# Patient Record
Sex: FEMALE | Race: WHITE | Employment: OTHER | URBAN - METROPOLITAN AREA
[De-identification: names, ages, dates, MRNs, and addresses within clinical notes are randomized per-mention and may not be internally consistent; named-entity substitution may affect disease eponyms.]

---

## 2018-09-26 PROBLEM — E11.40 TYPE 2 DIABETES MELLITUS WITH DIABETIC NEUROPATHY (HCC): Status: ACTIVE | Noted: 2018-09-26

## 2018-10-29 ENCOUNTER — PATIENT OUTREACH (OUTPATIENT)
Dept: CASE MANAGEMENT | Age: 83
End: 2018-10-29

## 2018-10-29 NOTE — PROGRESS NOTES
Medication Adherence Outreach    Date/Time of Call:  10/29/2018  2:35 pm    Name of medication and dosage:   * Ramipril 10 mg 1 every day   * Pravastatin 40 mg 1 every day    Does the patient know the purpose and dosage of medication? * Yes    Are you getting a #30 day or #90 day supply of your medication? * 30 day supply    Are you still taking this medication? If not, why? * Yes    Transportation issues or any problems paying for the medication or other reason? * No    What pharmacy are you using to fill your medication and do you know the last time that you got your medication filled? * [facility]   * Last refill 10/23/2018   Are you having any side effects from taking your medication? * No       Any other questions or concerns expressed by the patient. * No    Comments:  * Discussed medication adherence with Ms. [facility] and she states she has no current barriers to prevent her from obtaining or taking her medication. Discussed potential cost savings by her obtaining a 90 day supply of her medication. She declines at this time .          Call Completed By:     Padmini Meza

## 2020-04-13 PROBLEM — K57.92 DIVERTICULITIS: Status: ACTIVE | Noted: 2020-04-13

## 2020-05-08 ENCOUNTER — APPOINTMENT (OUTPATIENT)
Dept: GENERAL RADIOLOGY | Age: 85
DRG: 536 | End: 2020-05-08
Attending: EMERGENCY MEDICINE
Payer: MEDICARE

## 2020-05-08 ENCOUNTER — ANESTHESIA EVENT (OUTPATIENT)
Dept: SURGERY | Age: 85
End: 2020-05-08

## 2020-05-08 ENCOUNTER — HOSPITAL ENCOUNTER (INPATIENT)
Age: 85
LOS: 2 days | Discharge: HOME HEALTH | DRG: 536 | End: 2020-05-10
Attending: EMERGENCY MEDICINE | Admitting: INTERNAL MEDICINE
Payer: MEDICARE

## 2020-05-08 DIAGNOSIS — S72.002A CLOSED LEFT HIP FRACTURE, INITIAL ENCOUNTER (HCC): Primary | ICD-10-CM
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
## 2020-05-08 DIAGNOSIS — S72.145A CLOSED NONDISPLACED INTERTROCHANTERIC FRACTURE OF LEFT FEMUR, INITIAL ENCOUNTER (HCC): ICD-10-CM

## 2020-05-08 PROBLEM — E11.9 NON-INSULIN DEPENDENT TYPE 2 DIABETES MELLITUS (HCC): Status: ACTIVE | Noted: 2017-02-21

## 2020-05-08 PROBLEM — Z86.73 HISTORY OF STROKE: Chronic | Status: ACTIVE | Noted: 2020-05-08

## 2020-05-08 PROBLEM — S72.142A INTERTROCHANTERIC FRACTURE OF LEFT HIP (HCC): Status: ACTIVE | Noted: 2020-05-08

## 2020-05-08 PROBLEM — S72.009A HIP FRACTURE (HCC): Status: ACTIVE | Noted: 2020-05-08

## 2020-05-08 PROBLEM — M85.80 OSTEOPENIA: Chronic | Status: ACTIVE | Noted: 2020-05-08

## 2020-05-08 PROBLEM — E11.9 NON-INSULIN DEPENDENT TYPE 2 DIABETES MELLITUS (HCC): Chronic | Status: ACTIVE | Noted: 2017-02-21

## 2020-05-08 LAB
ABO + RH BLD: NORMAL
ANION GAP SERPL CALC-SCNC: 8 MMOL/L (ref 7–16)
APPEARANCE UR: CLEAR
APTT PPP: 26 SEC (ref 24.3–35.4)
ATRIAL RATE: 97 BPM
BILIRUB UR QL: NEGATIVE
BLOOD GROUP ANTIBODIES SERPL: NORMAL
BUN SERPL-MCNC: 18 MG/DL (ref 8–23)
CALCIUM SERPL-MCNC: 9.5 MG/DL (ref 8.3–10.4)
CALCULATED P AXIS, ECG09: 59 DEGREES
CALCULATED R AXIS, ECG10: 16 DEGREES
CALCULATED T AXIS, ECG11: 35 DEGREES
CHLORIDE SERPL-SCNC: 105 MMOL/L (ref 98–107)
CK SERPL-CCNC: 184 U/L (ref 21–215)
CO2 SERPL-SCNC: 25 MMOL/L (ref 21–32)
COLOR UR: YELLOW
CREAT SERPL-MCNC: 0.87 MG/DL (ref 0.6–1)
DIAGNOSIS, 93000: NORMAL
ERYTHROCYTE [DISTWIDTH] IN BLOOD BY AUTOMATED COUNT: 14.2 % (ref 11.9–14.6)
GLUCOSE BLD STRIP.AUTO-MCNC: 152 MG/DL (ref 65–100)
GLUCOSE SERPL-MCNC: 149 MG/DL (ref 65–100)
GLUCOSE UR STRIP.AUTO-MCNC: >1000 MG/DL
HCT VFR BLD AUTO: 44.7 % (ref 35.8–46.3)
HGB BLD-MCNC: 14.7 G/DL (ref 11.7–15.4)
HGB UR QL STRIP: NEGATIVE
INR PPP: 0.9
KETONES UR QL STRIP.AUTO: NEGATIVE MG/DL
LEUKOCYTE ESTERASE UR QL STRIP.AUTO: NEGATIVE
MCH RBC QN AUTO: 30.2 PG (ref 26.1–32.9)
MCHC RBC AUTO-ENTMCNC: 32.9 G/DL (ref 31.4–35)
MCV RBC AUTO: 91.8 FL (ref 79.6–97.8)
NITRITE UR QL STRIP.AUTO: NEGATIVE
NRBC # BLD: 0 K/UL (ref 0–0.2)
P-R INTERVAL, ECG05: 162 MS
PH UR STRIP: 5.5 [PH] (ref 5–9)
PLATELET # BLD AUTO: 262 K/UL (ref 150–450)
PMV BLD AUTO: 10.8 FL (ref 9.4–12.3)
POTASSIUM SERPL-SCNC: 4.3 MMOL/L (ref 3.5–5.1)
POTASSIUM SERPL-SCNC: 5.8 MMOL/L (ref 3.5–5.1)
PROT UR STRIP-MCNC: NEGATIVE MG/DL
PROTHROMBIN TIME: 12.3 SEC (ref 12–14.7)
Q-T INTERVAL, ECG07: 332 MS
QRS DURATION, ECG06: 88 MS
QTC CALCULATION (BEZET), ECG08: 421 MS
RBC # BLD AUTO: 4.87 M/UL (ref 4.05–5.2)
SODIUM SERPL-SCNC: 138 MMOL/L (ref 136–145)
SP GR UR REFRACTOMETRY: 1.02 (ref 1–1.02)
SPECIMEN EXP DATE BLD: NORMAL
UROBILINOGEN UR QL STRIP.AUTO: 0.2 EU/DL (ref 0.2–1)
VENTRICULAR RATE, ECG03: 97 BPM
WBC # BLD AUTO: 13.5 K/UL (ref 4.3–11.1)

## 2020-05-08 PROCEDURE — 84132 ASSAY OF SERUM POTASSIUM: CPT

## 2020-05-08 PROCEDURE — 80048 BASIC METABOLIC PNL TOTAL CA: CPT

## 2020-05-08 PROCEDURE — 74011250636 HC RX REV CODE- 250/636: Performed by: NURSE PRACTITIONER

## 2020-05-08 PROCEDURE — 71045 X-RAY EXAM CHEST 1 VIEW: CPT

## 2020-05-08 PROCEDURE — 74011000302 HC RX REV CODE- 302: Performed by: NURSE PRACTITIONER

## 2020-05-08 PROCEDURE — 73552 X-RAY EXAM OF FEMUR 2/>: CPT

## 2020-05-08 PROCEDURE — 99285 EMERGENCY DEPT VISIT HI MDM: CPT

## 2020-05-08 PROCEDURE — 74011250636 HC RX REV CODE- 250/636: Performed by: EMERGENCY MEDICINE

## 2020-05-08 PROCEDURE — 74011250636 HC RX REV CODE- 250/636: Performed by: INTERNAL MEDICINE

## 2020-05-08 PROCEDURE — 81003 URINALYSIS AUTO W/O SCOPE: CPT

## 2020-05-08 PROCEDURE — 65270000029 HC RM PRIVATE

## 2020-05-08 PROCEDURE — 82962 GLUCOSE BLOOD TEST: CPT

## 2020-05-08 PROCEDURE — 96375 TX/PRO/DX INJ NEW DRUG ADDON: CPT

## 2020-05-08 PROCEDURE — 85027 COMPLETE CBC AUTOMATED: CPT

## 2020-05-08 PROCEDURE — 73502 X-RAY EXAM HIP UNI 2-3 VIEWS: CPT

## 2020-05-08 PROCEDURE — 85610 PROTHROMBIN TIME: CPT

## 2020-05-08 PROCEDURE — 93005 ELECTROCARDIOGRAM TRACING: CPT | Performed by: EMERGENCY MEDICINE

## 2020-05-08 PROCEDURE — 82550 ASSAY OF CK (CPK): CPT

## 2020-05-08 PROCEDURE — 86900 BLOOD TYPING SEROLOGIC ABO: CPT

## 2020-05-08 PROCEDURE — 36415 COLL VENOUS BLD VENIPUNCTURE: CPT

## 2020-05-08 PROCEDURE — 96376 TX/PRO/DX INJ SAME DRUG ADON: CPT

## 2020-05-08 PROCEDURE — 74011250637 HC RX REV CODE- 250/637: Performed by: NURSE PRACTITIONER

## 2020-05-08 PROCEDURE — 85730 THROMBOPLASTIN TIME PARTIAL: CPT

## 2020-05-08 PROCEDURE — 96374 THER/PROPH/DIAG INJ IV PUSH: CPT

## 2020-05-08 PROCEDURE — 93005 ELECTROCARDIOGRAM TRACING: CPT | Performed by: NURSE PRACTITIONER

## 2020-05-08 PROCEDURE — 86580 TB INTRADERMAL TEST: CPT | Performed by: NURSE PRACTITIONER

## 2020-05-08 PROCEDURE — 74011636637 HC RX REV CODE- 636/637: Performed by: NURSE PRACTITIONER

## 2020-05-08 RX ORDER — SODIUM CHLORIDE 0.9 % (FLUSH) 0.9 %
5-40 SYRINGE (ML) INJECTION AS NEEDED
Status: DISCONTINUED | OUTPATIENT
Start: 2020-05-08 | End: 2020-05-10 | Stop reason: HOSPADM

## 2020-05-08 RX ORDER — OXYCODONE HYDROCHLORIDE 5 MG/1
10 TABLET ORAL
Status: DISCONTINUED | OUTPATIENT
Start: 2020-05-08 | End: 2020-05-10 | Stop reason: HOSPADM

## 2020-05-08 RX ORDER — GABAPENTIN 300 MG/1
600 CAPSULE ORAL 3 TIMES DAILY
Status: DISCONTINUED | OUTPATIENT
Start: 2020-05-10 | End: 2020-05-10 | Stop reason: HOSPADM

## 2020-05-08 RX ORDER — ONDANSETRON 2 MG/ML
4 INJECTION INTRAMUSCULAR; INTRAVENOUS
Status: COMPLETED | OUTPATIENT
Start: 2020-05-08 | End: 2020-05-08

## 2020-05-08 RX ORDER — HYDROMORPHONE HYDROCHLORIDE 1 MG/ML
0.5 INJECTION, SOLUTION INTRAMUSCULAR; INTRAVENOUS; SUBCUTANEOUS
Status: COMPLETED | OUTPATIENT
Start: 2020-05-08 | End: 2020-05-08

## 2020-05-08 RX ORDER — SODIUM CHLORIDE 0.9 % (FLUSH) 0.9 %
5-40 SYRINGE (ML) INJECTION EVERY 8 HOURS
Status: DISCONTINUED | OUTPATIENT
Start: 2020-05-08 | End: 2020-05-10 | Stop reason: HOSPADM

## 2020-05-08 RX ORDER — HYDROMORPHONE HYDROCHLORIDE 1 MG/ML
0.5 INJECTION, SOLUTION INTRAMUSCULAR; INTRAVENOUS; SUBCUTANEOUS
Status: DISCONTINUED | OUTPATIENT
Start: 2020-05-08 | End: 2020-05-10 | Stop reason: HOSPADM

## 2020-05-08 RX ORDER — DULOXETIN HYDROCHLORIDE 30 MG/1
30 CAPSULE, DELAYED RELEASE ORAL DAILY
Status: DISCONTINUED | OUTPATIENT
Start: 2020-05-09 | End: 2020-05-10 | Stop reason: HOSPADM

## 2020-05-08 RX ORDER — INSULIN LISPRO 100 [IU]/ML
INJECTION, SOLUTION INTRAVENOUS; SUBCUTANEOUS
Status: DISCONTINUED | OUTPATIENT
Start: 2020-05-08 | End: 2020-05-10 | Stop reason: HOSPADM

## 2020-05-08 RX ORDER — LISINOPRIL 20 MG/1
40 TABLET ORAL DAILY
Status: DISCONTINUED | OUTPATIENT
Start: 2020-05-09 | End: 2020-05-10 | Stop reason: HOSPADM

## 2020-05-08 RX ORDER — ACETAMINOPHEN 325 MG/1
650 TABLET ORAL EVERY 8 HOURS
Status: DISCONTINUED | OUTPATIENT
Start: 2020-05-08 | End: 2020-05-10 | Stop reason: HOSPADM

## 2020-05-08 RX ORDER — SODIUM CHLORIDE 9 MG/ML
75 INJECTION, SOLUTION INTRAVENOUS CONTINUOUS
Status: DISCONTINUED | OUTPATIENT
Start: 2020-05-08 | End: 2020-05-08

## 2020-05-08 RX ORDER — PRAVASTATIN SODIUM 20 MG/1
40 TABLET ORAL
Status: DISCONTINUED | OUTPATIENT
Start: 2020-05-09 | End: 2020-05-10 | Stop reason: HOSPADM

## 2020-05-08 RX ORDER — NALOXONE HYDROCHLORIDE 0.4 MG/ML
0.4 INJECTION, SOLUTION INTRAMUSCULAR; INTRAVENOUS; SUBCUTANEOUS
Status: DISCONTINUED | OUTPATIENT
Start: 2020-05-08 | End: 2020-05-10 | Stop reason: HOSPADM

## 2020-05-08 RX ORDER — ONDANSETRON 2 MG/ML
4 INJECTION INTRAMUSCULAR; INTRAVENOUS
Status: DISCONTINUED | OUTPATIENT
Start: 2020-05-08 | End: 2020-05-10 | Stop reason: HOSPADM

## 2020-05-08 RX ORDER — SODIUM CHLORIDE 9 MG/ML
100 INJECTION, SOLUTION INTRAVENOUS CONTINUOUS
Status: DISCONTINUED | OUTPATIENT
Start: 2020-05-08 | End: 2020-05-10 | Stop reason: HOSPADM

## 2020-05-08 RX ORDER — HYDROMORPHONE HYDROCHLORIDE 1 MG/ML
0.5 INJECTION, SOLUTION INTRAMUSCULAR; INTRAVENOUS; SUBCUTANEOUS
Status: DISCONTINUED | OUTPATIENT
Start: 2020-05-08 | End: 2020-05-08

## 2020-05-08 RX ORDER — GABAPENTIN 300 MG/1
600 CAPSULE ORAL ONCE
Status: COMPLETED | OUTPATIENT
Start: 2020-05-08 | End: 2020-05-08

## 2020-05-08 RX ADMIN — INSULIN LISPRO 2 UNITS: 100 INJECTION, SOLUTION INTRAVENOUS; SUBCUTANEOUS at 21:44

## 2020-05-08 RX ADMIN — SODIUM CHLORIDE 75 ML/HR: 900 INJECTION, SOLUTION INTRAVENOUS at 17:19

## 2020-05-08 RX ADMIN — ONDANSETRON 4 MG: 2 INJECTION INTRAMUSCULAR; INTRAVENOUS at 14:16

## 2020-05-08 RX ADMIN — TUBERCULIN PURIFIED PROTEIN DERIVATIVE 5 UNITS: 5 INJECTION, SOLUTION INTRADERMAL at 21:45

## 2020-05-08 RX ADMIN — ACETAMINOPHEN 650 MG: 325 TABLET, FILM COATED ORAL at 18:03

## 2020-05-08 RX ADMIN — GABAPENTIN 600 MG: 300 CAPSULE ORAL at 21:43

## 2020-05-08 RX ADMIN — ACETAMINOPHEN 650 MG: 325 TABLET, FILM COATED ORAL at 21:43

## 2020-05-08 RX ADMIN — HYDROMORPHONE HYDROCHLORIDE 0.5 MG: 1 INJECTION, SOLUTION INTRAMUSCULAR; INTRAVENOUS; SUBCUTANEOUS at 15:06

## 2020-05-08 RX ADMIN — HYDROMORPHONE HYDROCHLORIDE 0.5 MG: 1 INJECTION, SOLUTION INTRAMUSCULAR; INTRAVENOUS; SUBCUTANEOUS at 14:16

## 2020-05-08 RX ADMIN — SODIUM CHLORIDE 100 ML/HR: 9 INJECTION, SOLUTION INTRAVENOUS at 18:07

## 2020-05-08 RX ADMIN — SODIUM CHLORIDE 1000 ML: 9 INJECTION, SOLUTION INTRAVENOUS at 14:16

## 2020-05-08 RX ADMIN — OXYCODONE HYDROCHLORIDE 10 MG: 5 TABLET ORAL at 21:43

## 2020-05-08 RX ADMIN — Medication 10 ML: at 21:45

## 2020-05-08 RX ADMIN — HYDROMORPHONE HYDROCHLORIDE 0.5 MG: 1 INJECTION, SOLUTION INTRAMUSCULAR; INTRAVENOUS; SUBCUTANEOUS at 17:04

## 2020-05-08 RX ADMIN — OXYCODONE HYDROCHLORIDE 10 MG: 5 TABLET ORAL at 18:04

## 2020-05-08 RX ADMIN — ONDANSETRON 4 MG: 2 INJECTION INTRAMUSCULAR; INTRAVENOUS at 17:04

## 2020-05-08 NOTE — ED TRIAGE NOTES
Pt BIB EMS after fall in the yard, states that her leg gave out and she fell onto the hip. Found in the yard on EMS arrival, unable to stand. Reports sharp pain with movement of her left hip. Pt masked in triage.

## 2020-05-08 NOTE — ANESTHESIA PREPROCEDURE EVALUATION
Relevant Problems   No relevant active problems       Anesthetic History   No history of anesthetic complications            Review of Systems / Medical History  Patient summary reviewed and pertinent labs reviewed    Pulmonary  Within defined limits                 Neuro/Psych       CVA (residual left sided weakness)       Cardiovascular    Hypertension              Exercise tolerance: >4 METS  Comments: Normal stress '19  ECHO '19 - preserved EF with mod TR   GI/Hepatic/Renal             Pertinent negatives: No GERD   Endo/Other    Diabetes (A1c 7.9 per patient): well controlled, type 2         Other Findings            Physical Exam    Airway  Mallampati: I  TM Distance: 4 - 6 cm  Neck ROM: normal range of motion   Mouth opening: Normal     Cardiovascular    Rhythm: regular  Rate: normal         Dental    Dentition: Full lower dentures and Full upper dentures     Pulmonary  Breath sounds clear to auscultation               Abdominal         Other Findings            Anesthetic Plan    ASA: 3  Anesthesia type: general          Induction: Intravenous  Anesthetic plan and risks discussed with: Patient      Patient prefers GA over spinal.  Recheck K+ tomorrow morning.
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
## 2020-05-08 NOTE — PROGRESS NOTES
05/08/20 1746   Dual Skin Pressure Injury Assessment   Dual Skin Pressure Injury Assessment WDL   Second Care Provider (Based on [address] [place] [address]) Rj Hayward RN   Skin Integumentary   Skin Integumentary (WDL) X   Skin Color Appropriate for ethnicity   Skin Condition/Temp Warm;Dry   Skin Integrity Scars (comment)   Turgor Epidermis thin w/ loss of subcut tissue

## 2020-05-08 NOTE — ED PROVIDER NOTES
[facility] Emergency Department  Arrival Date/Time: 5/8/2020 @  2:08 PM      Aaron Hutchinson  MRN: [id number]      82 y.o. female    YOB: 1935   [phone] (home)     [facility] EMERGENCY DEPT ER05/05  Seen on 5/8/2020 @ 2:11 PM      Today's Chief Complaint:   Chief Complaint   Patient presents with    Hip Pain     HPI: 28-year-old female presents to the emergency department with left hip pain  She was out in her yard she states her left leg gave way and she fell over. She tried to crawl back to the house but was unable to get up. Her daughter called EMS and she was transported for evaluation. Left hip is held flexed and internally rotated. She is tender over the posterior aspect of the hip. LOC no confusion no nausea no vomiting. HPI    Review of Systems: Review of Systems   Constitutional: Negative for activity change, appetite change, chills and fever. HENT: Negative. Respiratory: Negative for shortness of breath. Gastrointestinal: Negative for nausea and vomiting. Genitourinary: Negative. Musculoskeletal: Positive for arthralgias. Neurological: Negative. Psychiatric/Behavioral: Negative. Past Medical History: Primary Care Doctor: Vinnie Hackett MD  Meds, PMH, PSHx, SocHx at end of this note     Allergies: Allergies   Allergen Reactions    Neosporin [Hydrocortisone] Other (comments)     Made rash worse when pt had the Shingles    Codeine Other (comments)     Headache      Sulfa (Sulfonamide Antibiotics) Hives         Key Anti-Platelet Anticoagulant Meds             aspirin delayed-release 81 mg tablet Take  by mouth daily. Physical Exam:  Nursing documentation reviewed. Patient Vitals for the past 24 hrs:   Temp Pulse Resp BP SpO2   05/08/20 1507  (!) 101 19 171/79 95 %   05/08/20 1426  (!) 103 9  92 %   05/08/20 1405 98.7 °F (37.1 °C) (!) 112 20 158/81 94 %    Vital signs were reviewed.   Physical Exam  Vitals signs and nursing note reviewed. Constitutional:       General: She is not in acute distress. Appearance: Normal appearance. She is not ill-appearing or toxic-appearing. HENT:      Head: Normocephalic and atraumatic. Right Ear: Tympanic membrane normal.      Left Ear: Tympanic membrane normal.      Nose: Nose normal.      Mouth/Throat:      Mouth: Mucous membranes are moist.   Eyes:      Pupils: Pupils are equal, round, and reactive to light. Cardiovascular:      Rate and Rhythm: Normal rate and regular rhythm. Pulses: Normal pulses. Heart sounds: Normal heart sounds. Pulmonary:      Effort: Pulmonary effort is normal.      Breath sounds: Normal breath sounds. Musculoskeletal:         General: Tenderness and deformity present. Skin:     General: Skin is warm. Capillary Refill: Capillary refill takes less than 2 seconds. Neurological:      Mental Status: She is alert and oriented to person, place, and time. Psychiatric:         Mood and Affect: Mood normal.         Behavior: Behavior normal.         MEDICAL DECISION MAKING:   Differential Diagnosis:    MDM  Number of Diagnoses or Management Options  Closed left hip fracture, initial encounter [facility]):   Fall, initial encounter:   Diagnosis management comments: 28-year-old female status post fall at home complains of pain in the left hip. It is noted to be internally rotated and flexed    Differential at this time includes traumatic injury, muscle contusion, hip dislocation versus a hip fracture    We will obtain labs and x-ray treat her pain and reassess.              Amount and/or Complexity of Data Reviewed  Clinical lab tests: ordered  Tests in the radiology section of CPT®: ordered  Tests in the medicine section of CPT®: ordered    Risk of Complications, Morbidity, and/or Mortality  Presenting problems: moderate  Diagnostic procedures: minimal  Management options: high      Extended Emergency Contact Information  Primary Emergency Contact: Martha Mello  Address: [place] [address] [place] [address] ab[address] Phone: [phone]  Relation: Child   Family updated at 2:22 PM by Donna Estrada MD.          Data/Management:    Lab findings during this visit:   Recent Results (from the past 50 hour(s))   TYPE & SCREEN    Collection Time: 05/08/20  2:21 PM   Result Value Ref Range    Crossmatch Expiration 05/11/2020     ABO/Rh(D) A POSITIVE     Antibody screen NEG    CBC W/O DIFF    Collection Time: 05/08/20  2:22 PM   Result Value Ref Range    WBC 13.5 (H) 4.3 - 11.1 K/uL    RBC 4.87 4.05 - 5.2 M/uL    HGB 14.7 11.7 - 15.4 g/dL    HCT 44.7 35.8 - 46.3 %    MCV 91.8 79.6 - 97.8 FL    MCH 30.2 26.1 - 32.9 PG    MCHC 32.9 31.4 - 35.0 g/dL    RDW 14.2 11.9 - 14.6 %    PLATELET 707 818 - 140 K/uL    MPV 10.8 9.4 - 12.3 FL    ABSOLUTE NRBC 0.00 0.0 - 0.2 K/uL   METABOLIC PANEL, BASIC    Collection Time: 05/08/20  2:22 PM   Result Value Ref Range    Sodium 138 136 - 145 mmol/L    Potassium 5.8 (H) 3.5 - 5.1 mmol/L    Chloride 105 98 - 107 mmol/L    CO2 25 21 - 32 mmol/L    Anion gap 8 7 - 16 mmol/L    Glucose 149 (H) 65 - 100 mg/dL    BUN 18 8 - 23 MG/DL    Creatinine 0.87 0.6 - 1.0 MG/DL    GFR est AA >60 >60 ml/min/1.73m2    GFR est non-AA >60 >60 ml/min/1.73m2    Calcium 9.5 8.3 - 10.4 MG/DL   PROTHROMBIN TIME + INR    Collection Time: 05/08/20  2:22 PM   Result Value Ref Range    Prothrombin time 12.3 12.0 - 14.7 sec    INR 0.9     CK    Collection Time: 05/08/20  2:22 PM   Result Value Ref Range     21 - 215 U/L     Radiology studies during this visit: No results found.   Medications given in the ED:   Medications   HYDROmorphone (PF) (DILAUDID) injection 0.5 mg (0.5 mg IntraVENous Given 5/8/20 1506)   HYDROmorphone (PF) (DILAUDID) injection 0.5 mg (0.5 mg IntraVENous Given 5/8/20 1416)   ondansetron (ZOFRAN) injection 4 mg (4 mg IntraVENous Given 5/8/20 1416)   sodium chloride 0.9 % bolus infusion 1,000 mL (1,000 mL IntraVENous New Bag 5/8/20 1416)       Recheck and Additional Documentation:  (use .addrecheck  . addsepsis   . addstroke   . addhip  . addhandoff  . addcctime)     Xray reviewed -- pt with intertroch fracture on the left    Called and advised her family. Procedure Documentation:   Procedures     Other ED Course Notes:     ED Course as of May 08 1606   Fri May 08, 2020   1602 XR HIP LT W OR WO PELV 2-3 VWS [GH]   1605 Pt with left inter-troch fracture. Consult ortho  Consult hospitalist   XR FEMUR LT 2 V [GH]      ED Course User Index  [GH] Serjio Queen MD       I wore appropriate PPE throughout this patient's ED encounter. Assessment and Plan:    Impression:     ICD-10-CM ICD-9-CM    1. Closed left hip fracture, initial encounter ([facility] [address] S72.002A 820.8    2. Fall, initial encounter [address] Jose Elias Galindoer E888.9       Disposition:    Follow-up Information    None       Current Discharge Medication List          Past Medical History:      Past Medical History:   Diagnosis Date    Colon polyps     Diabetes ([facility] [address]     Hiatal hernia     Hypercholesterolemia     Hypertension     Stroke ([facility] [address]     left sided weakness  12/09    Thyroid disease     goiter     Past Surgical History:   Procedure Laterality Date    HX GI      esophageal stricture    HX PARTIAL THYROIDECTOMY      HX VIC AND BSO       Social History     Tobacco Use    Smoking status: Never Smoker    Smokeless tobacco: Never Used   Substance Use Topics    Alcohol use: No    Drug use: No     Prior to Admission Medications   Prescriptions Last Dose Informant Patient Reported? Taking? Blood-Glucose Meter monitoring kit   No No   Sig: Accuchek meter, strips lancets    Check blood sugar once a day.  250.00   Patient taking differently: Accuchek meter, strips lancets     Check blood sugar once a day.  250.00   DULoxetine (CYMBALTA) 30 mg capsule   No No   Sig: Take 1 Cap by mouth daily.    OTHER   No No   Sig: Rollator with seat   aspirin delayed-release 81 mg tablet   Yes No   Sig: Take  by mouth daily. diclofenac (VOLTAREN) 1 % gel   No No   Sig: Apply 4 g to affected area four (4) times daily. empagliflozin (Jardiance) 10 mg tablet   No No   Sig: Take 1 Tab by mouth daily. gabapentin (NEURONTIN) 300 mg capsule   No No   Sig: Take 2 capsules three times a day   glucose blood VI test strips (ACCU-CHEK SIDDHARTH PLUS TEST STRP) strip   No No   Sig: Check blood sugar once a day   E11.65   lancets (ACCU-CHEK SOFTCLIX LANCETS) misc   No No   Sig: Check blood sugar once a day  E11.65   metFORMIN (GLUCOPHAGE) 500 mg tablet   No No   Sig: Take two tablets twice a day   pioglitazone (ACTOS) 15 mg tablet   No No   Sig: Take one a day   pravastatin (PRAVACHOL) 40 mg tablet   No No   Sig: Take 1 Tab by mouth nightly. ramipril (ALTACE) 10 mg capsule   No No   Sig: Take 1 Cap by mouth daily.       Facility-Administered Medications: None

## 2020-05-08 NOTE — ED NOTES
TRANSFER - OUT REPORT:    Verbal report given to [facility]) on [place] Rafael  being transferred to  701(unit) for routine progression of care       Report consisted of patients Situation, Background, Assessment and   Recommendations(SBAR). Information from the following report(s) ED Summary was reviewed with the receiving nurse. Lines:   Peripheral IV 05/08/20 Right Antecubital (Active)        Opportunity for questions and clarification was provided.       Patient transported with:   O2 @ 2 liters

## 2020-05-08 NOTE — PROGRESS NOTES
TRANSFER - IN REPORT:    Verbal report received from Diane Pollack RN on Joel Davis  being received from ED for routine progression of care      Report consisted of patients Situation, Background, Assessment and   Recommendations(SBAR). Information from the following report(s) SBAR and ED Summary was reviewed with the receiving nurse. Opportunity for questions and clarification was provided. Assessment completed upon patients arrival to unit and care assumed.

## 2020-05-09 ENCOUNTER — APPOINTMENT (OUTPATIENT)
Dept: CT IMAGING | Age: 85
DRG: 536 | End: 2020-05-09
Attending: ORTHOPAEDIC SURGERY
Payer: MEDICARE

## 2020-05-09 ENCOUNTER — ANESTHESIA (OUTPATIENT)
Dept: SURGERY | Age: 85
End: 2020-05-09

## 2020-05-09 LAB
ANION GAP SERPL CALC-SCNC: 5 MMOL/L (ref 7–16)
ATRIAL RATE: 103 BPM
BASOPHILS # BLD: 0.1 K/UL (ref 0–0.2)
BASOPHILS NFR BLD: 1 % (ref 0–2)
BUN SERPL-MCNC: 11 MG/DL (ref 8–23)
CALCIUM SERPL-MCNC: 8.6 MG/DL (ref 8.3–10.4)
CALCULATED P AXIS, ECG09: 68 DEGREES
CALCULATED R AXIS, ECG10: -2 DEGREES
CALCULATED T AXIS, ECG11: 44 DEGREES
CHLORIDE SERPL-SCNC: 108 MMOL/L (ref 98–107)
CO2 SERPL-SCNC: 29 MMOL/L (ref 21–32)
CREAT SERPL-MCNC: 0.68 MG/DL (ref 0.6–1)
DIAGNOSIS, 93000: NORMAL
DIFFERENTIAL METHOD BLD: NORMAL
EOSINOPHIL # BLD: 0.3 K/UL (ref 0–0.8)
EOSINOPHIL NFR BLD: 3 % (ref 0.5–7.8)
ERYTHROCYTE [DISTWIDTH] IN BLOOD BY AUTOMATED COUNT: 14.1 % (ref 11.9–14.6)
GLUCOSE BLD STRIP.AUTO-MCNC: 111 MG/DL (ref 65–100)
GLUCOSE BLD STRIP.AUTO-MCNC: 115 MG/DL (ref 65–100)
GLUCOSE BLD STRIP.AUTO-MCNC: 121 MG/DL (ref 65–100)
GLUCOSE BLD STRIP.AUTO-MCNC: 149 MG/DL (ref 65–100)
GLUCOSE BLD STRIP.AUTO-MCNC: 184 MG/DL (ref 65–100)
GLUCOSE SERPL-MCNC: 118 MG/DL (ref 65–100)
HCT VFR BLD AUTO: 40.3 % (ref 35.8–46.3)
HGB BLD-MCNC: 12.8 G/DL (ref 11.7–15.4)
IMM GRANULOCYTES # BLD AUTO: 0 K/UL (ref 0–0.5)
IMM GRANULOCYTES NFR BLD AUTO: 0 % (ref 0–5)
LYMPHOCYTES # BLD: 1.7 K/UL (ref 0.5–4.6)
LYMPHOCYTES NFR BLD: 18 % (ref 13–44)
MAGNESIUM SERPL-MCNC: 2.2 MG/DL (ref 1.8–2.4)
MCH RBC QN AUTO: 29.9 PG (ref 26.1–32.9)
MCHC RBC AUTO-ENTMCNC: 31.8 G/DL (ref 31.4–35)
MCV RBC AUTO: 94.2 FL (ref 79.6–97.8)
MM INDURATION POC: 0 MM (ref 0–5)
MONOCYTES # BLD: 1.1 K/UL (ref 0.1–1.3)
MONOCYTES NFR BLD: 12 % (ref 4–12)
NEUTS SEG # BLD: 6.3 K/UL (ref 1.7–8.2)
NEUTS SEG NFR BLD: 66 % (ref 43–78)
NRBC # BLD: 0 K/UL (ref 0–0.2)
P-R INTERVAL, ECG05: 156 MS
PLATELET # BLD AUTO: 224 K/UL (ref 150–450)
PMV BLD AUTO: 10.5 FL (ref 9.4–12.3)
POTASSIUM SERPL-SCNC: 4.3 MMOL/L (ref 3.5–5.1)
PPD POC: NEGATIVE NEGATIVE
Q-T INTERVAL, ECG07: 342 MS
QRS DURATION, ECG06: 94 MS
QTC CALCULATION (BEZET), ECG08: 448 MS
RBC # BLD AUTO: 4.28 M/UL (ref 4.05–5.2)
SODIUM SERPL-SCNC: 142 MMOL/L (ref 136–145)
VENTRICULAR RATE, ECG03: 103 BPM
WBC # BLD AUTO: 9.4 K/UL (ref 4.3–11.1)

## 2020-05-09 PROCEDURE — 36415 COLL VENOUS BLD VENIPUNCTURE: CPT

## 2020-05-09 PROCEDURE — 73700 CT LOWER EXTREMITY W/O DYE: CPT

## 2020-05-09 PROCEDURE — 85025 COMPLETE CBC W/AUTO DIFF WBC: CPT

## 2020-05-09 PROCEDURE — 80048 BASIC METABOLIC PNL TOTAL CA: CPT

## 2020-05-09 PROCEDURE — 65270000029 HC RM PRIVATE

## 2020-05-09 PROCEDURE — 97116 GAIT TRAINING THERAPY: CPT

## 2020-05-09 PROCEDURE — 97161 PT EVAL LOW COMPLEX 20 MIN: CPT

## 2020-05-09 PROCEDURE — 83735 ASSAY OF MAGNESIUM: CPT

## 2020-05-09 PROCEDURE — 74011250636 HC RX REV CODE- 250/636: Performed by: NURSE PRACTITIONER

## 2020-05-09 PROCEDURE — 82962 GLUCOSE BLOOD TEST: CPT

## 2020-05-09 PROCEDURE — 74011250637 HC RX REV CODE- 250/637: Performed by: NURSE PRACTITIONER

## 2020-05-09 PROCEDURE — 97530 THERAPEUTIC ACTIVITIES: CPT

## 2020-05-09 PROCEDURE — 97166 OT EVAL MOD COMPLEX 45 MIN: CPT

## 2020-05-09 RX ADMIN — PRAVASTATIN SODIUM 40 MG: 20 TABLET ORAL at 22:22

## 2020-05-09 RX ADMIN — OXYCODONE HYDROCHLORIDE 10 MG: 5 TABLET ORAL at 13:32

## 2020-05-09 RX ADMIN — HYDROMORPHONE HYDROCHLORIDE 0.5 MG: 1 INJECTION, SOLUTION INTRAMUSCULAR; INTRAVENOUS; SUBCUTANEOUS at 08:21

## 2020-05-09 RX ADMIN — HYDROMORPHONE HYDROCHLORIDE 0.5 MG: 1 INJECTION, SOLUTION INTRAMUSCULAR; INTRAVENOUS; SUBCUTANEOUS at 16:17

## 2020-05-09 RX ADMIN — OXYCODONE HYDROCHLORIDE 10 MG: 5 TABLET ORAL at 18:21

## 2020-05-09 RX ADMIN — OXYCODONE HYDROCHLORIDE 10 MG: 5 TABLET ORAL at 22:22

## 2020-05-09 RX ADMIN — ONDANSETRON 4 MG: 2 INJECTION INTRAMUSCULAR; INTRAVENOUS at 08:21

## 2020-05-09 RX ADMIN — ACETAMINOPHEN 650 MG: 325 TABLET, FILM COATED ORAL at 05:58

## 2020-05-09 RX ADMIN — DULOXETINE HYDROCHLORIDE 30 MG: 30 CAPSULE, DELAYED RELEASE ORAL at 08:24

## 2020-05-09 RX ADMIN — Medication 10 ML: at 13:18

## 2020-05-09 RX ADMIN — Medication 10 ML: at 22:23

## 2020-05-09 RX ADMIN — ACETAMINOPHEN 650 MG: 325 TABLET, FILM COATED ORAL at 22:22

## 2020-05-09 RX ADMIN — OXYCODONE HYDROCHLORIDE 10 MG: 5 TABLET ORAL at 05:58

## 2020-05-09 RX ADMIN — Medication 10 ML: at 06:01

## 2020-05-09 RX ADMIN — ONDANSETRON 4 MG: 2 INJECTION INTRAMUSCULAR; INTRAVENOUS at 20:28

## 2020-05-09 NOTE — PROGRESS NOTES
Hospitalist Progress Note     Admit Date:  2020  2:08 PM   Name:  Royal Hall   Age:  82 y.o.  :  1935   MRN:  [id number]   PCP:  Lola Ellis MD  Treatment Team: Attending Provider: Rosa Flores MD; Hospitalist: Rubie Aase, NP; Consulting Provider: Ac Gibbs MD; Consulting Provider: Isac Esquivel MD; Charge Nurse: Christy Langston RN; Utilization Review: Karan Hansen RN    Subjective:   HPI and or CC:  82year old CF PMH HTN, DM, hyperlipidemia admitted  after mechanical fall ground level and injuring left hip. She was seen by ortho, CT done left hip showing nonoperative greater trochanter fx. Per patient, plans to go home with daughter when stable. Does not want rehab.    :  PT eval pending. Patient alert and oriented x3. No distress. Afebrile, no leukocytosis. Daughter-Martha Erving-updated. All questions answered.          Objective:     Patient Vitals for the past 24 hrs:   Temp Pulse Resp BP SpO2   20 0705 97.9 °F (36.6 °C) 74 18 126/68 96 %   20 0517 97.7 °F (36.5 °C) 76 18 128/69 96 %   20 2243 98 °F (36.7 °C) 85 17 120/67 94 %   20 1947 97.5 °F (36.4 °C) (!) 105 18 147/71 98 %   20 1746 97.9 °F (36.6 °C) (!) 104 18 152/81 92 %   20 1704 97.7 °F (36.5 °C) 100 16 166/72 96 %   20 1630  (!) 108 13 167/80 96 %   20 1507  (!) 101 19 171/79 95 %   20 1426  (!) 103 9  92 %   20 1405 98.7 °F (37.1 °C) (!) 112 20 158/81 94 %     Oxygen Therapy  O2 Sat (%): 96 % (20 0705)  Pulse via Oximetry: 109 beats per minute (20 1630)  O2 Device: Room air (20 1704)  O2 Flow Rate (L/min): 2 l/min (20 1425)  ETCO2 (mmHg): 2 mmHg (20 1704)    Intake/Output Summary (Last 24 hours) at 2020 1019  Last data filed at 2020 0517  Gross per 24 hour   Intake    Output 1300 ml   Net -1300 ml         REVIEW OF SYSTEMS: Comprehensive ROS performed and negative except as stated in HPI. Physical Examination:  General:    Well nourished. No gross distress. Head:  Normocephalic, atraumatic, nares patent  Eyes:  Extraocular movements intact, normal sclera  CV:   RRR. No  Murmurs, clicks, or gallops, distal pulses intact  Lungs:   Unlabored, no cyanosis, no wheeze  Abdomen:   Soft, nondistended, nontender. Extremities: Warm and dry. No cyanosis or edema. Skin:     No rashes or jaundice. Neuro:  No gross focal deficits, no tremor  Psych:  Mood and affect appropriate    Data Review:  I have reviewed all labs, meds, telemetry events, and studies from the last 24 hours. Recent Results (from the past 24 hour(s))   EKG, 12 LEAD, INITIAL    Collection Time: 05/08/20  2:11 PM   Result Value Ref Range    Ventricular Rate 97 BPM    Atrial Rate 97 BPM    P-R Interval 162 ms    QRS Duration 88 ms    Q-T Interval 332 ms    QTC Calculation (Bezet) 421 ms    Calculated P Axis 59 degrees    Calculated R Axis 16 degrees    Calculated T Axis 35 degrees    Diagnosis       !! AGE AND GENDER SPECIFIC ECG ANALYSIS !!   Normal sinus rhythm  Cannot rule out Anterior infarct , age undetermined  Abnormal ECG  No previous ECGs available  Confirmed by MARIA ISABEL SNOW (), Luis Wallace ([id number]) on 5/8/2020 4:37:30 PM     TYPE & SCREEN    Collection Time: 05/08/20  2:21 PM   Result Value Ref Range    Crossmatch Expiration 05/11/2020     ABO/Rh(D) A POSITIVE     Antibody screen NEG    CBC W/O DIFF    Collection Time: 05/08/20  2:22 PM   Result Value Ref Range    WBC 13.5 (H) 4.3 - 11.1 K/uL    RBC 4.87 4.05 - 5.2 M/uL    HGB 14.7 11.7 - 15.4 g/dL    HCT 44.7 35.8 - 46.3 %    MCV 91.8 79.6 - 97.8 FL    MCH 30.2 26.1 - 32.9 PG    MCHC 32.9 31.4 - 35.0 g/dL    RDW 14.2 11.9 - 14.6 %    PLATELET 622 521 - 875 K/uL    MPV 10.8 9.4 - 12.3 FL    ABSOLUTE NRBC 0.00 0.0 - 0.2 K/uL   METABOLIC PANEL, BASIC    Collection Time: 05/08/20  2:22 PM   Result Value Ref Range    Sodium 138 136 - 145 mmol/L    Potassium 5.8 (H) 3.5 - 5.1 mmol/L    Chloride 105 98 - 107 mmol/L    CO2 25 21 - 32 mmol/L    Anion gap 8 7 - 16 mmol/L    Glucose 149 (H) 65 - 100 mg/dL    BUN 18 8 - 23 MG/DL    Creatinine 0.87 0.6 - 1.0 MG/DL    GFR est AA >60 >60 ml/min/1.73m2    GFR est non-AA >60 >60 ml/min/1.73m2    Calcium 9.5 8.3 - 10.4 MG/DL   PROTHROMBIN TIME + INR    Collection Time: 05/08/20  2:22 PM   Result Value Ref Range    Prothrombin time 12.3 12.0 - 14.7 sec    INR 0.9     CK    Collection Time: 05/08/20  2:22 PM   Result Value Ref Range     21 - 215 U/L   URINALYSIS W/ RFLX MICROSCOPIC    Collection Time: 05/08/20  5:12 PM   Result Value Ref Range    Color YELLOW      Appearance CLEAR      Specific gravity 1.019 1.001 - 1.023      pH (UA) 5.5 5.0 - 9.0      Protein Negative NEG mg/dL    Glucose >1,000 mg/dL    Ketone Negative NEG mg/dL    Bilirubin Negative NEG      Blood Negative NEG      Urobilinogen 0.2 0.2 - 1.0 EU/dL    Nitrites Negative NEG      Leukocyte Esterase Negative NEG     EKG, 12 LEAD, INITIAL    Collection Time: 05/08/20  6:33 PM   Result Value Ref Range    Ventricular Rate 103 BPM    Atrial Rate 103 BPM    P-R Interval 156 ms    QRS Duration 94 ms    Q-T Interval 342 ms    QTC Calculation (Bezet) 448 ms    Calculated P Axis 68 degrees    Calculated R Axis -2 degrees    Calculated T Axis 44 degrees    Diagnosis       Sinus tachycardia with Premature atrial complexes  Otherwise normal ECG  When compared with ECG of 08-MAY-2020 14:11,  Premature atrial complexes are now Present  Confirmed by DUNCAN SNOW (), Alena Yung ([id number]) on 5/9/2020 10:14:21 AM     GLUCOSE, POC    Collection Time: 05/08/20  8:33 PM   Result Value Ref Range    Glucose (POC) 152 (H) 65 - 100 mg/dL   POTASSIUM    Collection Time: 05/08/20  8:38 PM   Result Value Ref Range    Potassium 4.3 3.5 - 5.1 mmol/L   PTT    Collection Time: 05/08/20  9:21 PM   Result Value Ref Range    aPTT 26.0 24.3 - 39.6 SEC   METABOLIC PANEL, BASIC    Collection Time: 05/09/20  4:05 AM Result Value Ref Range    Sodium 142 136 - 145 mmol/L    Potassium 4.3 3.5 - 5.1 mmol/L    Chloride 108 (H) 98 - 107 mmol/L    CO2 29 21 - 32 mmol/L    Anion gap 5 (L) 7 - 16 mmol/L    Glucose 118 (H) 65 - 100 mg/dL    BUN 11 8 - 23 MG/DL    Creatinine 0.68 0.6 - 1.0 MG/DL    GFR est AA >60 >60 ml/min/1.73m2    GFR est non-AA >60 >60 ml/min/1.73m2    Calcium 8.6 8.3 - 10.4 MG/DL   CBC WITH AUTOMATED DIFF    Collection Time: 05/09/20  4:05 AM   Result Value Ref Range    WBC 9.4 4.3 - 11.1 K/uL    RBC 4.28 4.05 - 5.2 M/uL    HGB 12.8 11.7 - 15.4 g/dL    HCT 40.3 35.8 - 46.3 %    MCV 94.2 79.6 - 97.8 FL    MCH 29.9 26.1 - 32.9 PG    MCHC 31.8 31.4 - 35.0 g/dL    RDW 14.1 11.9 - 14.6 %    PLATELET 867 097 - 846 K/uL    MPV 10.5 9.4 - 12.3 FL    ABSOLUTE NRBC 0.00 0.0 - 0.2 K/uL    DF AUTOMATED      NEUTROPHILS 66 43 - 78 %    LYMPHOCYTES 18 13 - 44 %    MONOCYTES 12 4.0 - 12.0 %    EOSINOPHILS 3 0.5 - 7.8 %    BASOPHILS 1 0.0 - 2.0 %    IMMATURE GRANULOCYTES 0 0.0 - 5.0 %    ABS. NEUTROPHILS 6.3 1.7 - 8.2 K/UL    ABS. LYMPHOCYTES 1.7 0.5 - 4.6 K/UL    ABS. MONOCYTES 1.1 0.1 - 1.3 K/UL    ABS. EOSINOPHILS 0.3 0.0 - 0.8 K/UL    ABS. BASOPHILS 0.1 0.0 - 0.2 K/UL    ABS. IMM.  GRANS. 0.0 0.0 - 0.5 K/UL   MAGNESIUM    Collection Time: 05/09/20  4:05 AM   Result Value Ref Range    Magnesium 2.2 1.8 - 2.4 mg/dL   GLUCOSE, POC    Collection Time: 05/09/20  6:48 AM   Result Value Ref Range    Glucose (POC) 115 (H) 65 - 100 mg/dL        All Micro Results     Procedure Component Value Units Date/Time    EMERGENT DISEASE PANEL [[id number]]     Order Status:  Sent     MSSA/MRSA SC BY PCR, NASAL SWAB [[id number]]     Order Status:  Sent Specimen:  Nasal swab           Current Meds:  Current Facility-Administered Medications   Medication Dose Route Frequency    0.9% sodium chloride infusion  100 mL/hr IntraVENous CONTINUOUS    sodium chloride (NS) flush 5-40 mL  5-40 mL IntraVENous Q8H    sodium chloride (NS) flush 5-40 mL  5-40 mL IntraVENous PRN    acetaminophen (TYLENOL) tablet 650 mg  650 mg Oral Q8H    tuberculin injection 5 Units  5 Units IntraDERMal ONCE    DULoxetine (CYMBALTA) capsule 30 mg  30 mg Oral DAILY    [START ON 5/10/2020] gabapentin (NEURONTIN) capsule 600 mg  600 mg Oral TID    pravastatin (PRAVACHOL) tablet 40 mg  40 mg Oral QHS    lisinopriL (PRINIVIL, ZESTRIL) tablet 40 mg  40 mg Oral DAILY    insulin lispro (HUMALOG) injection   SubCUTAneous AC&HS    oxyCODONE IR (ROXICODONE) tablet 10 mg  10 mg Oral Q3H PRN    HYDROmorphone (PF) (DILAUDID) injection 0.5 mg  0.5 mg IntraVENous Q2H PRN    ondansetron (ZOFRAN) injection 4 mg  4 mg IntraVENous Q4H PRN    naloxone (NARCAN) injection 0.4 mg  0.4 mg IntraVENous EVERY 2 MINUTES AS NEEDED       Diet:  DIET REGULAR    Other Studies (last 24 hours):  Xr Chest Sngl V    Result Date: 5/8/2020  Chest portable CLINICAL INDICATION: Preoperative evaluation before hip fracture surgery, acute weakness, fall, unable to stand COMPARISON: None TECHNIQUE: single AP portable view chest at 4:00 PM supine FINDINGS: Cardiac silhouette is mildly enlarged. There is a large soft tissue retrocardiac density suggestive of hiatal hernia. No evidence of a pneumothorax, consolidation, pleural effusion, or CHF. Normal hilar contours. Patient is rotated to the left. Lungs are well-inflated. Diffusely low bone density. IMPRESSION: 1. No consolidation. 2. Cardiac enlargement. 3. Hiatal hernia. Xr Hip Lt W Or Wo Pelv 2-3 Vws    Result Date: 5/8/2020  Left hip Left femur Clinical indication: Acute severe pain lateral left hip and proximal left femur after a fall injury, unable to stand and bear weight, limited range of motion Comparison: none Technique: 3 views of left hip and 2 views of left femur Findings: Bone density is diffusely low, likely age-related osteopenia. There are scattered age appropriate degenerative changes of the pelvis and both hips, mild to moderate.  Tiny sclerotic focus in the right superior acetabulum is most likely an incidental benign bone island. Left femur: There is a nondisplaced oblique fracture involving the greater trochanter and intertrochanteric line. No evidence of displaced fragment or articular surface involvement. Remainder of left femur appears intact. Mild chronic degenerative changes are noted at the knee. Left hip: There is no evidence of fracture, dislocation, or erosion at this level. IMPRESSION: 1. Acute fracture of left intertrochanteric femur. 2. Degenerative joint changes. Xr Femur Lt 2 V    Result Date: 5/8/2020  Left hip Left femur Clinical indication: Acute severe pain lateral left hip and proximal left femur after a fall injury, unable to stand and bear weight, limited range of motion Comparison: none Technique: 3 views of left hip and 2 views of left femur Findings: Bone density is diffusely low, likely age-related osteopenia. There are scattered age appropriate degenerative changes of the pelvis and both hips, mild to moderate. Tiny sclerotic focus in the right superior acetabulum is most likely an incidental benign bone island. Left femur: There is a nondisplaced oblique fracture involving the greater trochanter and intertrochanteric line. No evidence of displaced fragment or articular surface involvement. Remainder of left femur appears intact. Mild chronic degenerative changes are noted at the knee. Left hip: There is no evidence of fracture, dislocation, or erosion at this level. IMPRESSION: 1. Acute fracture of left intertrochanteric femur. 2. Degenerative joint changes. Ct Hip Lt Wo Cont    Result Date: 5/9/2020  CT LEFT HIP WITHOUT CONTRAST 5/9/2020 HISTORY: Proximal left femur fracture. TECHNIQUE: Noncontrast axial images were obtained through the pelvis. Multiplanar reformatted images were generated.      All CT scans at this facility used dose modulation, iterative reconstruction and/or weight based dosing when appropriate to reduce radiation dose to as low as reasonably achievable. COMPARISON: Left hip Radiograph is from the previous day FINDINGS: There is a fracture through the greater trochanter of the left hip. A portion of the cortical fracture extends to the junction of the left greater trochanter and left femoral neck (coronal image 35). This is also demonstrated on the sagittal images 70 through 79. There is no extension of displaced fracture into the intertrochanteric region, however, please note that CT is less sensitive than MR for the assessment of the extent of hip fractures. Osteoarthritis is present in the left hip with significant loss of joint space. A urinary catheter is present within the bladder. Multiple calcified pelvic phleboliths are present. IMPRESSION: 1. Fracture of the left greater trochanter with a component of the fracture extending to the base of the greater trochanter. 2. Osteoarthritis.       Assessment and Plan:     Hospital Problems as of 5/9/2020 Date Reviewed: 5/8/2020          Codes Class Noted - Resolved POA    * (Principal) Intertrochanteric fracture of left hip ([facility] ICD-10-CM: X13.377O  ICD-9-CM: 820.21  5/8/2020 - Present Yes        Osteopenia (Chronic) ICD-10-CM: M85.80  ICD-9-CM: 733.90  5/8/2020 - Present Yes    Overview Signed 5/8/2020  5:25 PM by Rubie Aase, NP     FOUND ON XRAY 5/8/20             History of stroke (Chronic) ICD-10-CM: O32.85  ICD-9-CM: V12.54  5/8/2020 - Present Yes    Overview Signed 5/8/2020  5:26 PM by Rubie Aase, NP     [place] 2010             Non-insulin dependent type 2 diabetes mellitus ([facility] (Chronic) ICD-10-CM: E11.9  ICD-9-CM: 250.00  2/21/2017 - Present Yes    Overview Signed 5/8/2020  5:22 PM by Rubie Aase, NP     A1c: 10/2019: 7.9             Hyperlipidemia (Chronic) ICD-10-CM: E78.5  ICD-9-CM: 272.4  6/3/2014 - Present Yes        Hemiparesis affecting left side as late effect of stroke (HCC) (Chronic) ICD-10-CM: O73.918  ICD-9-CM: 438.20  10/29/2013 - Present Yes    Overview Signed 5/8/2020  5:24 PM by Jaron Washington NP     MILD PER PATIENT. Neuropathy (Chronic) ICD-10-CM: G62.9  ICD-9-CM: 355.9  10/29/2013 - Present Yes        Mixed hyperlipidemia (Chronic) ICD-10-CM: D94.1  ICD-9-CM: 272.2  6/27/2013 - Present Yes        Essential hypertension, benign (Chronic) ICD-10-CM: I10  ICD-9-CM: 401.1  6/27/2013 - Present Yes              A/P:    1. Left hip fx:  Per CT of hip, only greater trochanter fx, not intertroch. WBAT, PT eval.  Plans to go home with daughter and son in law per patient. 2. Essential HTN:  Home medications. 3. Mixed hyperlipidemia:  Home medications. 4. Type II DM not requiring insulin:  SSI, home medications.         Signed:  Kika Clement NP
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
## 2020-05-09 NOTE — CONSULTS
Consult    Patient: Faith Ng MRN: [id number]  SSN: [id number]    YOB: 1935  Age: 82 y.o. Sex: female      Subjective:      Faith Ng is a 82 y.o. female who sort of felt her left leg give way and she thinks she fell onto her left side. She says she was able to walk on the leg for a few steps but got to the point where she was having too much pain to really be able to bear weight. She denies any other problems besides her left lower extremity and left hip area. She has had a lot of difficulty with her left hip over the last few years which she attributes to some arthritis. .    Past Medical History:   Diagnosis Date    Colon polyps     Goiter     PARTIAL THYROIDECTOMY     Hemiparesis affecting left side as late effect of stroke ([facility] [address] 2010    Hiatal hernia     History of stroke 2010    IN 2010    Hyperlipidemia 6/3/2014    Hypertension     Neuropathy 10/29/2013    Non-insulin dependent type 2 diabetes mellitus ([facility] [address] 2/21/2017    Osteopenia 05/08/2020    FOUND ON XRAY     Past Surgical History:   Procedure Laterality Date    HX GI      esophageal stricture    HX PARTIAL THYROIDECTOMY      HX VIC AND BSO        FAMHX -No history of inflammatory arthritis   Social History     Tobacco Use    Smoking status: Never Smoker    Smokeless tobacco: Never Used   Substance Use Topics    Alcohol use: No      Current Facility-Administered Medications   Medication Dose Route Frequency Provider Last Rate Last Dose    0.9% sodium chloride infusion  100 mL/hr IntraVENous CONTINUOUS Megan Luther  mL/hr at 05/08/20 1807 100 mL/hr at 05/08/20 1807    sodium chloride (NS) flush 5-40 mL  5-40 mL IntraVENous Q8H Megan Luther NP   10 mL at 05/09/20 0601    sodium chloride (NS) flush 5-40 mL  5-40 mL IntraVENous PRN Salvatore Luther NP        acetaminophen (TYLENOL) tablet 650 mg  650 mg Oral Q8H Megan Luther NP   650 mg at 05/09/20 0558    tuberculin injection 5 Units  5 Units IntraDERMal ONCE Megan Luther NP   5 Units at 05/08/20 2145    DULoxetine (CYMBALTA) capsule 30 mg  30 mg Oral DAILY Yaritza Luther NP       Anne Ada [START ON 5/10/2020] gabapentin (NEURONTIN) capsule 600 mg  600 mg Oral TID Yaritza Luther NP        pravastatin (PRAVACHOL) tablet 40 mg  40 mg Oral QHS Megan Luther NP        lisinopriL (PRINIVIL, ZESTRIL) tablet 40 mg  40 mg Oral DAILY Yaritza Luther NP        insulin lispro (HUMALOG) injection   SubCUTAneous AC&HS Reginald Gaines NP   2 Units at 05/08/20 2144    oxyCODONE IR (ROXICODONE) tablet 10 mg  10 mg Oral Q3H PRN Yaritza Luther NP   10 mg at 05/09/20 0558    HYDROmorphone (PF) (DILAUDID) injection 0.5 mg  0.5 mg IntraVENous Q2H PRN Yaritza Luther NP        ondansetron (ZOFRAN) injection 4 mg  4 mg IntraVENous Q4H PRN Yaritza Luther NP        naloxone (NARCAN) injection 0.4 mg  0.4 mg IntraVENous EVERY 2 MINUTES AS NEEDED Yaritza Luther NP            Allergies   Allergen Reactions    Neosporin [Hydrocortisone] Other (comments)     Made rash worse when pt had the Shingles    Codeine Other (comments)     Headache      Sulfa (Sulfonamide Antibiotics) Other (comments)     SKIN EXCORIATION. ? ARREAGA NOBLE'S SYNDROME? Review of Systems:  A comprehensive review of systems was negative except for that written in the History of Present Illness. Objective:     Vitals:    05/08/20 1746 05/08/20 1947 05/08/20 2243 05/09/20 0517   BP: 152/81 147/71 120/67 128/69   Pulse: (!) 104 (!) 105 85 76   Resp: 18 18 17 18   Temp: 97.9 °F (36.6 °C) 97.5 °F (36.4 °C) 98 °F (36.7 °C) 97.7 °F (36.5 °C)   SpO2: 92% 98% 94% 96%   Weight:       Height:            Physical Exam:  Physical Exam:  General:  Alert, cooperative, no distress, appears stated age. Orientation she is alert and oriented to person place time and situation   Eyes:  Conjunctivae/corneas clear. PERRL, EOMs intact. Fundi benign   Ears:  Normal TMs and external ear canals both ears. Nose: Nares normal. Septum midline. Mucosa normal. No drainage or sinus tenderness. Mouth/Throat: Lips, mucosa, and tongue normal. Teeth and gums normal.   Neck: Supple, symmetrical, trachea midline, no adenopathy, thyroid: no enlargment/tenderness/nodules, no carotid bruit and no JVD. Back:   Symmetric, no curvature. ROM normal. No CVA tenderness. Lungs:   Clear to auscultation bilaterally. Heart:  Regular rate and rhythm, S1, S2 normal, no murmur, click, rub or gallop. Abdomen:   Soft, non-tender. Bowel sounds normal. No masses,  No organomegaly. No lymphadenopathy in all 4 extremities  Alignment- pt has no obvious deformity of the left hip  Range of motion- with any range of motion leftt hip  Vascular-distal pulses palpable in left lower extremity  Sensory/motor-deep tendon reflexes normal left lower extremity. Motor and sensory function intact.   Stability- is difficult to assess stability because of the presence of the fracture  Tenderness to palpation over the leftt greater trochanter area  Skin- no rashes ulcerations or open wounds left lower extremity  Gait- cannot put any weight on the leftt lower extremity      Assessment:     Hospital Problems  Date Reviewed: 5/8/2020          Codes Class Noted POA    * (Principal) Intertrochanteric fracture of left hip ([facility] [address] ICD-10-CM: L00.814Y  ICD-9-CM: 820.21  5/8/2020 Yes        Osteopenia (Chronic) ICD-10-CM: M85.80  ICD-9-CM: 733.90  5/8/2020 Yes    Overview Signed 5/8/2020  5:25 PM by Priya Mukherjee NP     FOUND ON XRAY 5/8/20             History of stroke (Chronic) ICD-10-CM: P67.24  ICD-9-CM: V12.54  5/8/2020 Yes    Overview Signed 5/8/2020  5:26 PM by Pirya Mukherjee NP     IN 2010             Non-insulin dependent type 2 diabetes mellitus ([facility] [address] (Chronic) ICD-10-CM: E11.9  ICD-9-CM: 250.00  2/21/2017 Yes    Overview Signed 5/8/2020  5:22 PM by Priya Mukherjee NP     A1c: 10/2019: 7.9             Hyperlipidemia (Chronic) ICD-10-CM: E78.5  ICD-9-CM: 272.4  6/3/2014 Yes        Hemiparesis affecting left side as late effect of stroke ([facility] [address] (Chronic) ICD-10-CM: R78.572  ICD-9-CM: 438.20  10/29/2013 Yes    Overview Signed 5/8/2020  5:24 PM by Ally Garcia NP     MILD PER PATIENT. Neuropathy (Chronic) ICD-10-CM: G62.9  ICD-9-CM: 355.9  10/29/2013 Yes        Mixed hyperlipidemia (Chronic) ICD-10-CM: P81.4  ICD-9-CM: 272.2  6/27/2013 Yes        Essential hypertension, benign (Chronic) ICD-10-CM: I10  ICD-9-CM: 401.1  6/27/2013 Yes            Left hip greater trochanter fracture    Xrays and or studies:    AP and lateral views of the left hip shows a left hip greater trochanter fracture. I do not see any obvious fracture in the intertrochanteric area even though the radiologist has read this. Plan: At this point it may well be that she does have a completely nondisplaced intertrochanteric fracture line but the x-rays to my reading do not show an obvious intertrochanteric fracture line. I think the way to answer the question will be to get a CT scan and see if she indeed has a fracture line that propagates itself across the intertrochanteric region. If this does then she will need operative fixation. If she just has the greater trochanter fracture then this can be treated nonoperatively.   I will order the CT scan and review this after the CT is done    Signed By: Kaci Larsen MD

## 2020-05-09 NOTE — PROGRESS NOTES
Problem: Self Care Deficits Care Plan (Adult)  Goal: *Acute Goals and Plan of Care (Insert Text)  Description: 1. Patient will complete lower body bathing and dressing with minimal assistance and adaptive equipment as needed. 2. Patient will complete toileting with CGA. 3. Patient will tolerate 30 minutes of OT treatment with 2-3 rest breaks to increase activity tolerance for ADLs. 4. Patient will complete functional transfers with CGA and adaptive equipment as needed. 5. Patient will participate in upper body exercises for 8 minutes to improve strength for ADL/functional transfers. Timeframe: 7 visits      Outcome: Progressing Towards Goal     OCCUPATIONAL THERAPY: Initial Assessment, Daily Note, and PM 5/9/2020  INPATIENT: OT Visit Days: 1  Payor: Ben Escam[place] / Plan: [address] / Product Type: Managed Care Medicare /   L LE  WBAT for transfers only; non-operative fx   NAME/AGE/GENDER: Des Cruz is a 82 y.o. female   PRIMARY DIAGNOSIS:  Hip fracture ([facility] [address] [S72.009A] Intertrochanteric fracture of left hip ([facility] [address] Intertrochanteric fracture of left hip (HCC)  Procedure(s) (LRB):  FEMUR INSERTION INTRA MEDULLARY NAIL (Left)     ICD-10: Treatment Diagnosis:    Pain in left hip (M25.552)  Stiffness of Left Hip, Not elsewhere classified (M25.652)  Generalized Muscle Weakness (M62.81)  Other lack of cordination (R27.8)  History of falling (Z91.81)   Precautions/Allergies:     Neosporin [hydrocortisone]; Codeine; and Sulfa (sulfonamide antibiotics)      ASSESSMENT:     Ms. Ryan Juarez presents to the hospital with L non-operative hip fracture. Pt is WBAT in the L LE for transfers only. Pt lives with her daughter and son-in-law at baseline. Pt is typically independent to modified independent with functional mobility using a cane on occasion. Pt had recent fall in her yard. Pt completes her own ADLs and was still assisting with some cooking/cleaning.  Pt is home alone while her family is at work. Pt is supine in the bed upon arrival and reports \"12\"/10 pain in her L hip. Pt has hx of CVA with increased tone on her L side with residual weakness, stiffness, and impaired coordination. Pt reports some shoulder pain as well after falling to her L. Pt needed total assistance to don socks in the bed and maximal assistance x 2 for bed mobility due to pain levels. Pt did much better once sitting edge of bed and was able to scoot to the edge of the bed CGA. Pt completed standing to the walker with minimal assistance x 2. Pt required additional time to flatten her L foot (reports this happens at baseline too). Pt left up at the end of the session working with PT. Pt is currently functioning below baseline for ADL/functional transfers and will benefit from OT services to address stated goals and plan of care. This section established at most recent assessment   PROBLEM LIST (Impairments causing functional limitations):  Decreased Strength  Decreased ADL/Functional Activities  Decreased Transfer Abilities  Decreased Ambulation Ability/Technique  Decreased Balance  Increased Pain  Decreased Activity Tolerance  Decreased Flexibility/Joint Mobility  Decreased Desha with Home Exercise Program   INTERVENTIONS PLANNED: (Benefits and precautions of occupational therapy have been discussed with the patient.)  Activities of daily living training  Adaptive equipment training  Balance training  Clothing management  Donning&doffing training  Neuromuscular re-eduation  Therapeutic activity  Therapeutic exercise     TREATMENT PLAN: Frequency/Duration: Follow patient 3 times to address above goals. Rehabilitation Potential For Stated Goals: Excellent     REHAB RECOMMENDATIONS (at time of discharge pending progress):    Placement: It is my opinion, based on this patient's performance to date, that Ms. Madhu Barron may benefit from [address] after discharge due to the functional deficits listed above that are likely to improve with skilled rehabilitation because he/she has multiple medical issues that affect his/her functional mobility in the community. Pt will need 24 hr supervision at home. Equipment:   TBD               OCCUPATIONAL PROFILE AND HISTORY:   History of Present Injury/Illness (Reason for Referral):  See H&P  Past Medical History/Comorbidities:   Ms. Genesis Heart  has a past medical history of Colon polyps, Goiter, Hemiparesis affecting left side as late effect of stroke ([facility] [address] (2010), Hiatal hernia, History of stroke (2010), Hyperlipidemia (6/3/2014), Hypertension, Neuropathy (10/29/2013), Non-insulin dependent type 2 diabetes mellitus ([address] (2/21/2017), and Osteopenia (05/08/2020). Ms. Genesis Heart  has a past surgical history that includes hx partial thyroidectomy; hx kati and bso; and hx gi. Social History/Living Environment:   Home Environment: Private residence  # Steps to Enter: 0  One/Two Story Residence: One story  Living Alone: No  Support Systems: Child(carlin)(dtr and KELIN)  Patient Expects to be Discharged to[de-identified] Private residence  Current DME Used/Available at Home: Asa Held, straight, Commode, bedside, Grab bars, Shower chair, Walker, rollator  Tub or Shower Type: Shower  Prior Level of Function/Work/Activity:  Pt lives with her daughter and son-in-law at baseline. Pt is typically independent to modified independent with functional mobility using a cane on occasion. Pt had recent fall in her yard. Pt completes her own ADLs and was still assisting with some cooking/cleaning. Pt is home alone while her family is at work.   Personal Factors:          Social Background:  home alone while children were at work        Past/Current Experience:  hx of cva with L sided residual deficits; new non-operative hip fx        Other factors that influence how disability is experienced by the patient:  multiple co-morbidities    Number of Personal Factors/Comorbidities that affect the Plan of Care: Extensive review of physical, cognitive, and psychosocial performance (3+):  HIGH COMPLEXITY   ASSESSMENT OF OCCUPATIONAL PERFORMANCE[de-identified]   Activities of Daily Living:   Basic ADLs (From Assessment) Complex ADLs (From Assessment)   Feeding: Setup  Oral Facial Hygiene/Grooming: Stand-by assistance  Bathing: Moderate assistance  Upper Body Dressing: Minimum assistance  Lower Body Dressing: Maximum assistance  Toileting: Maximum assistance Instrumental ADL  Meal Preparation: Total assistance  Homemaking: Total assistance   Grooming/Bathing/Dressing Activities of Daily Living     Cognitive Retraining  Safety/Judgement: Awareness of environment; Fall prevention                       Bed/Mat Mobility  Supine to Sit: Maximum assistance;Assist x2  Sit to Stand: Minimum assistance;Assist x2  Stand to Sit: Minimum assistance;Assist x2  Bed to Chair: Minimum assistance;Assist x2  Scooting: Contact guard assistance     Most Recent Physical Functioning:   Gross Assessment:  AROM: Generally decreased, functional(residual deficits from stroke on the L side)  Strength: Generally decreased, functional(residual deficits from stroke on the L side)  Coordination: Generally decreased, functional(residual deficits from stroke on the L side)               Posture:  Posture (WDL): Exceptions to WDL  Posture Assessment: Rounded shoulders  Balance:  Sitting: Impaired  Sitting - Static: Good (unsupported)  Sitting - Dynamic: Fair (occasional)  Standing: Impaired  Standing - Static: Constant support; Fair  Standing - Dynamic : Constant support; Fair Bed Mobility:  Supine to Sit: Maximum assistance;Assist x2  Scooting: Contact guard assistance  Wheelchair Mobility:     Transfers:  Sit to Stand: Minimum assistance;Assist x2  Stand to Sit: Minimum assistance;Assist x2  Bed to Chair: Minimum assistance;Assist x2            Patient Vitals for the past 6 hrs:   BP BP Patient Position SpO2 Pulse   05/09/20 1056 128/68 At rest 94 % 79   05/09/20 1442 117/56 Sitting 92 % 93       Mental Status  Neurologic State: Alert  Orientation Level: Oriented X4  Cognition: Follows commands  Perception: Appears intact  Perseveration: No perseveration noted  Safety/Judgement: Awareness of environment, Fall prevention                          Physical Skills Involved:  Range of Motion  Balance  Strength  Activity Tolerance  Pain (acute) Cognitive Skills Affected (resulting in the inability to perform in a timely and safe manner):  None  Psychosocial Skills Affected:  Habits/Routines   Number of elements that affect the Plan of Care: 5+:  HIGH COMPLEXITY   CLINICAL DECISION MAKING:   [facility] 6 Clicks   Daily Activity Inpatient Short Form  How much help from another person does the patient currently need. .. Total A Lot A Little None   1. Putting on and taking off regular lower body clothing? [] 1   [x] 2   [] 3   [] 4   2. Bathing (including washing, rinsing, drying)? [] 1   [x] 2   [] 3   [] 4   3. Toileting, which includes using toilet, bedpan or urinal?   [] 1   [x] 2   [] 3   [] 4   4. Putting on and taking off regular upper body clothing? [] 1   [] 2   [x] 3   [] 4   5. Taking care of personal grooming such as brushing teeth? [] 1   [] 2   [x] 3   [] 4   6. Eating meals? [] 1   [] 2   [x] 3   [] 4   © 2007, Trustees of [facility] [employer], under license to [employer]. All rights reserved      Score:  Initial: 15 Most Recent: X (Date: -- )    Interpretation of Tool:  Represents activities that are increasingly more difficult (i.e. Bed mobility, Transfers, Gait). Medical Necessity:     Patient demonstrates   good and excellent   rehab potential due to higher previous functional level. Reason for Services/Other Comments:  Patient continues to require skilled intervention due to   Decreased independence with ADL/functional transfers.     .   Use of outcome tool(s) and clinical judgement create a POC that gives a: LOW COMPLEXITY         TREATMENT:   (In addition to Assessment/Re-Assessment sessions the following treatments were rendered)     Pre-treatment Symptoms/Complaints:    Pain: Initial:   Pain Intensity 1: 10  Pain Location 1: Hip  Pain Orientation 1: Left  Pain Intervention(s) 1: Repositioned  Post Session:  same     Therapeutic Activity: (    8 minutes): Therapeutic activities including Bed transfers, scooting edge of bed, and sit to stand with minimal assistance x 2 to improve mobility, strength, balance, and coordination. Required minimal Safety awareness training;Verbal cues to promote static and dynamic balance in standing. N/a      Braces/Orthotics/Lines/Etc:   IV  lancaster catheter  O2 Device: Room air  Treatment/Session Assessment:    Response to Treatment:  Pt tolerated it pain in her L hip with transfers. Interdisciplinary Collaboration:   Occupational Therapist  Registered Nurse  After treatment position/precautions:   Up in chair  Bed alarm/tab alert on  Bed/Chair-wheels locked  Call light within reach  RN notified   Compliance with Program/Exercises: Will assess as treatment progresses. Recommendations/Intent for next treatment session: \"Next visit will focus on advancements to more challenging activities and reduction in assistance provided\".   Total Treatment Duration:  OT Patient Time In/Time Out  Time In: 1332  Time Out: [address] Evan Wagner OT

## 2020-05-09 NOTE — PROGRESS NOTES
Patient does not have intertroch fracture only a greater trochanter fracture which can be treated nonoperatively.   I would make her wt bearing as guille for transfers only left lower extremity

## 2020-05-09 NOTE — PROGRESS NOTES
CM met with patient to discuss d/c plan. Patient alert/orient to name, place and . Patient verified demographic information no changes needed. Patient states she lives in a one level home with family with four steps with handrails. Patient states she's independent with her ADL's and do has DME's in the home. Patient decline STR / [facility] states she's returning back home with no services. States she feels she will be ok. CM will continue to monitor. Care Management Interventions  PCP Verified by CM: Girish Ngo MD)  Mode of Transport at Discharge:  Other (see comment)(family)  Transition of Care Consult (CM Consult): Discharge Planning  Discharge Durable Medical Equipment: No  Physical Therapy Consult: Yes  Occupational Therapy Consult: Yes  Speech Therapy Consult: No  Current Support Network: Own Home, Relative's Home(Patient lives with daughter / son-in-law)  Confirm Follow Up Transport: Family  [place] Resource Information Provided?: No  Discharge Location  Discharge Placement: Home

## 2020-05-10 ENCOUNTER — HOME HEALTH ADMISSION (OUTPATIENT)
Dept: HOME HEALTH SERVICES | Facility: HOME HEALTH | Age: 85
End: 2020-05-10
Payer: MEDICARE

## 2020-05-10 VITALS
OXYGEN SATURATION: 96 % | HEIGHT: 63 IN | TEMPERATURE: 97.9 F | BODY MASS INDEX: 24.8 KG/M2 | SYSTOLIC BLOOD PRESSURE: 147 MMHG | HEART RATE: 88 BPM | DIASTOLIC BLOOD PRESSURE: 73 MMHG | RESPIRATION RATE: 17 BRPM | WEIGHT: 140 LBS

## 2020-05-10 LAB
ANION GAP SERPL CALC-SCNC: 5 MMOL/L (ref 7–16)
BASOPHILS # BLD: 0.1 K/UL (ref 0–0.2)
BASOPHILS NFR BLD: 1 % (ref 0–2)
BUN SERPL-MCNC: 9 MG/DL (ref 8–23)
CALCIUM SERPL-MCNC: 8.3 MG/DL (ref 8.3–10.4)
CHLORIDE SERPL-SCNC: 107 MMOL/L (ref 98–107)
CO2 SERPL-SCNC: 28 MMOL/L (ref 21–32)
CREAT SERPL-MCNC: 0.56 MG/DL (ref 0.6–1)
DIFFERENTIAL METHOD BLD: NORMAL
EOSINOPHIL # BLD: 0.3 K/UL (ref 0–0.8)
EOSINOPHIL NFR BLD: 3 % (ref 0.5–7.8)
ERYTHROCYTE [DISTWIDTH] IN BLOOD BY AUTOMATED COUNT: 13.9 % (ref 11.9–14.6)
GLUCOSE BLD STRIP.AUTO-MCNC: 109 MG/DL (ref 65–100)
GLUCOSE BLD STRIP.AUTO-MCNC: 95 MG/DL (ref 65–100)
GLUCOSE SERPL-MCNC: 111 MG/DL (ref 65–100)
HCT VFR BLD AUTO: 39.5 % (ref 35.8–46.3)
HGB BLD-MCNC: 12.7 G/DL (ref 11.7–15.4)
IMM GRANULOCYTES # BLD AUTO: 0 K/UL (ref 0–0.5)
IMM GRANULOCYTES NFR BLD AUTO: 0 % (ref 0–5)
LYMPHOCYTES # BLD: 1.5 K/UL (ref 0.5–4.6)
LYMPHOCYTES NFR BLD: 15 % (ref 13–44)
MCH RBC QN AUTO: 30.2 PG (ref 26.1–32.9)
MCHC RBC AUTO-ENTMCNC: 32.2 G/DL (ref 31.4–35)
MCV RBC AUTO: 93.8 FL (ref 79.6–97.8)
MONOCYTES # BLD: 1.1 K/UL (ref 0.1–1.3)
MONOCYTES NFR BLD: 11 % (ref 4–12)
NEUTS SEG # BLD: 6.9 K/UL (ref 1.7–8.2)
NEUTS SEG NFR BLD: 70 % (ref 43–78)
NRBC # BLD: 0 K/UL (ref 0–0.2)
PLATELET # BLD AUTO: 202 K/UL (ref 150–450)
PMV BLD AUTO: 10.3 FL (ref 9.4–12.3)
POTASSIUM SERPL-SCNC: 4.9 MMOL/L (ref 3.5–5.1)
RBC # BLD AUTO: 4.21 M/UL (ref 4.05–5.2)
SODIUM SERPL-SCNC: 140 MMOL/L (ref 136–145)
WBC # BLD AUTO: 9.8 K/UL (ref 4.3–11.1)

## 2020-05-10 PROCEDURE — 97535 SELF CARE MNGMENT TRAINING: CPT

## 2020-05-10 PROCEDURE — 80048 BASIC METABOLIC PNL TOTAL CA: CPT

## 2020-05-10 PROCEDURE — 82962 GLUCOSE BLOOD TEST: CPT

## 2020-05-10 PROCEDURE — 85025 COMPLETE CBC W/AUTO DIFF WBC: CPT

## 2020-05-10 PROCEDURE — 36415 COLL VENOUS BLD VENIPUNCTURE: CPT

## 2020-05-10 PROCEDURE — 74011250637 HC RX REV CODE- 250/637: Performed by: NURSE PRACTITIONER

## 2020-05-10 PROCEDURE — 74011250636 HC RX REV CODE- 250/636: Performed by: NURSE PRACTITIONER

## 2020-05-10 RX ORDER — ONDANSETRON 4 MG/1
4 TABLET, ORALLY DISINTEGRATING ORAL
Qty: 21 TAB | Refills: 0 | Status: SHIPPED | OUTPATIENT
Start: 2020-05-10 | End: 2020-05-17

## 2020-05-10 RX ORDER — OXYCODONE HYDROCHLORIDE 10 MG/1
10 TABLET ORAL
Qty: 18 TAB | Refills: 0 | Status: SHIPPED | OUTPATIENT
Start: 2020-05-10 | End: 2020-05-13

## 2020-05-10 RX ADMIN — LISINOPRIL 40 MG: 20 TABLET ORAL at 08:08

## 2020-05-10 RX ADMIN — GABAPENTIN 600 MG: 300 CAPSULE ORAL at 08:08

## 2020-05-10 RX ADMIN — ONDANSETRON 4 MG: 2 INJECTION INTRAMUSCULAR; INTRAVENOUS at 08:41

## 2020-05-10 RX ADMIN — DULOXETINE HYDROCHLORIDE 30 MG: 30 CAPSULE, DELAYED RELEASE ORAL at 08:08

## 2020-05-10 NOTE — DISCHARGE SUMMARY
Hospitalist Discharge Summary     Admit Date:  2020  2:08 PM   Name:  Raquel Nava   Age:  82 y.o.  :  1935   MRN:  [id number]   PCP:  Krupa Hammond MD  Treatment Team: Attending Provider: Jorge Hunt MD; Hospitalist: Tito Carl NP; Consulting Provider: Levi Weber MD; Utilization Review: Deb Ott RN; Occupational Therapist: Damaris Hassan; Nurse Practitioner: Julia Bernard NP; Charge Nurse: Corwin Bella; Physical Therapist: Bella Kim    Problem List for this Hospitalization:  Hospital Problems as of 5/10/2020 Date Reviewed: 2020          Codes Class Noted - Resolved POA    * (Principal) Intertrochanteric fracture of left hip ([facility] ICD-10-CM: M91.998X  ICD-9-CM: 820.21  2020 - Present Yes        Osteopenia (Chronic) ICD-10-CM: M85.80  ICD-9-CM: 733.90  2020 - Present Yes    Overview Signed 2020  5:25 PM by Tito Carl NP     FOUND ON XRAY 20             History of stroke (Chronic) ICD-10-CM: L98.68  ICD-9-CM: V12.54  2020 - Present Yes    Overview Signed 2020  5:26 PM by Tito Carl NP     IN              Non-insulin dependent type 2 diabetes mellitus ([facility] (Chronic) ICD-10-CM: E11.9  ICD-9-CM: 250.00  2017 - Present Yes    Overview Signed 2020  5:22 PM by Tito Carl NP     A1c: 10/2019: 7.9             Hyperlipidemia (Chronic) ICD-10-CM: E78.5  ICD-9-CM: 272.4  6/3/2014 - Present Yes        Hemiparesis affecting left side as late effect of stroke ([facility] (Chronic) ICD-10-CM: E34.523  ICD-9-CM: 438.20  10/29/2013 - Present Yes    Overview Signed 2020  5:24 PM by Tito Carl NP     MILD PER PATIENT.               Neuropathy (Chronic) ICD-10-CM: G62.9  ICD-9-CM: 355.9  10/29/2013 - Present Yes        Mixed hyperlipidemia (Chronic) ICD-10-CM: Z91.2  ICD-9-CM: 272.2  2013 - Present Yes        Essential hypertension, benign (Chronic) ICD-10-CM: I10  ICD-9-CM: 401.1  6/27/2013 - Present Yes                Admission HPI from 5/8/2020:    82year old CF PMH HTN, DM, hyperlipidemia admitted 5/8 after mechanical fall ground level and injuring left hip. She was seen by ortho, CT done left hip showing nonoperative greater trochanter fx. Per patient, plans to go home with daughter. Does not want rehab. Hospital Course:  Patient alert and oriented x3. No distress. Afebrile, no leukocytosis. Patient states discomfort to left hip but improving. She lives with daughter and son-in-law, I had a long discussion with Rita Peng. She is agreeable to mother coming to her house. She states having lift for commode, CM ordering narrow w/c. Shower chair at home, and [place] PT/OT ordered by CM. Follow up instructions and discharge meds at bottom of this note. Plan was discussed with patient,  Rita Peng. All questions answered. Patient was stable at time of discharge. 10 systems reviewed and negative except as noted in HPI. Diagnostic Imaging/Tests:   Xr Chest Sngl V    Result Date: 5/8/2020  Chest portable CLINICAL INDICATION: Preoperative evaluation before hip fracture surgery, acute weakness, fall, unable to stand COMPARISON: None TECHNIQUE: single AP portable view chest at 4:00 PM supine FINDINGS: Cardiac silhouette is mildly enlarged. There is a large soft tissue retrocardiac density suggestive of hiatal hernia. No evidence of a pneumothorax, consolidation, pleural effusion, or CHF. Normal hilar contours. Patient is rotated to the left. Lungs are well-inflated. Diffusely low bone density. IMPRESSION: 1. No consolidation. 2. Cardiac enlargement. 3. Hiatal hernia.      Xr Hip Lt W Or Wo Pelv 2-3 Vws    Result Date: 5/8/2020  Left hip Left femur Clinical indication: Acute severe pain lateral left hip and proximal left femur after a fall injury, unable to stand and bear weight, limited range of motion Comparison: none Technique: 3 views of left hip and 2 views of left femur Findings: Bone density is diffusely low, likely age-related osteopenia. There are scattered age appropriate degenerative changes of the pelvis and both hips, mild to moderate. Tiny sclerotic focus in the right superior acetabulum is most likely an incidental benign bone island. Left femur: There is a nondisplaced oblique fracture involving the greater trochanter and intertrochanteric line. No evidence of displaced fragment or articular surface involvement. Remainder of left femur appears intact. Mild chronic degenerative changes are noted at the knee. Left hip: There is no evidence of fracture, dislocation, or erosion at this level. IMPRESSION: 1. Acute fracture of left intertrochanteric femur. 2. Degenerative joint changes. Xr Femur Lt 2 V    Result Date: 5/8/2020  Left hip Left femur Clinical indication: Acute severe pain lateral left hip and proximal left femur after a fall injury, unable to stand and bear weight, limited range of motion Comparison: none Technique: 3 views of left hip and 2 views of left femur Findings: Bone density is diffusely low, likely age-related osteopenia. There are scattered age appropriate degenerative changes of the pelvis and both hips, mild to moderate. Tiny sclerotic focus in the right superior acetabulum is most likely an incidental benign bone island. Left femur: There is a nondisplaced oblique fracture involving the greater trochanter and intertrochanteric line. No evidence of displaced fragment or articular surface involvement. Remainder of left femur appears intact. Mild chronic degenerative changes are noted at the knee. Left hip: There is no evidence of fracture, dislocation, or erosion at this level. IMPRESSION: 1. Acute fracture of left intertrochanteric femur. 2. Degenerative joint changes. Ct Hip Lt Wo Cont    Result Date: 5/9/2020  CT LEFT HIP WITHOUT CONTRAST 5/9/2020 HISTORY: Proximal left femur fracture.  TECHNIQUE: Noncontrast axial images were obtained through the pelvis. Multiplanar reformatted images were generated. All CT scans at this facility used dose modulation, iterative reconstruction and/or weight based dosing when appropriate to reduce radiation dose to as low as reasonably achievable. COMPARISON: Left hip Radiograph is from the previous day FINDINGS: There is a fracture through the greater trochanter of the left hip. A portion of the cortical fracture extends to the junction of the left greater trochanter and left femoral neck (coronal image 35). This is also demonstrated on the sagittal images 70 through 79. There is no extension of displaced fracture into the intertrochanteric region, however, please note that CT is less sensitive than MR for the assessment of the extent of hip fractures. Osteoarthritis is present in the left hip with significant loss of joint space. A urinary catheter is present within the bladder. Multiple calcified pelvic phleboliths are present. IMPRESSION: 1. Fracture of the left greater trochanter with a component of the fracture extending to the base of the greater trochanter. 2. Osteoarthritis. Echocardiogram results:  No results found for this visit on 05/08/20.       All Micro Results     Procedure Component Value Units Date/Time    EMERGENT DISEASE PANEL [[id number]]     Order Status:  Canceled     MSSA/MRSA SC BY PCR, NASAL SWAB [[id number]]     Order Status:  Sent Specimen:  Nasal swab           Labs: Results:       BMP, Mg, Phos Recent Labs     05/10/20  0430 05/09/20  0405 05/08/20  2038 05/08/20  1422    142  --  138   K 4.9 4.3 4.3 5.8*    108*  --  105   CO2 28 29  --  25   AGAP 5* 5*  --  8   BUN 9 11  --  18   CREA 0.56* 0.68  --  0.87   CA 8.3 8.6  --  9.5   * 118*  --  149*   MG  --  2.2  --   --       CBC Recent Labs     05/10/20  0430 05/09/20  0405 05/08/20  1422   WBC 9.8 9.4 13.5*   RBC 4.21 4.28 4.87   HGB 12.7 12.8 14.7   HCT 39.5 40.3 44.7    224 262   GRANS 70 66  --    LYMPH 15 18  --    EOS 3 3  --    MONOS 11 12  --    BASOS 1 1  --    IG 0 0  --    ANEU 6.9 6.3  --    ABL 1.5 1.7  --    MAYDA 0.3 0.3  --    ABM 1.1 1.1  --    ABB 0.1 0.1  --    AIG 0.0 0.0  --       LFT No results for input(s): SGOT, ALT, TBIL, AP, TP, ALB, GLOB, AGRAT, GPT in the last 72 hours.    Cardiac Testing Lab Results   Component Value Date/Time     05/08/2020 02:22 PM      Coagulation Tests Lab Results   Component Value Date/Time    Prothrombin time 12.3 05/08/2020 02:22 PM    INR 0.9 05/08/2020 02:22 PM    aPTT 26.0 05/08/2020 09:21 PM      A1c Lab Results   Component Value Date/Time    Hemoglobin A1c 7.9 (H) 10/03/2019 12:28 PM    Hemoglobin A1c 7.0 (H) 03/26/2019 09:07 AM    Hemoglobin A1c 7.5 (H) 09/26/2018 09:26 AM      Lipid Panel Lab Results   Component Value Date/Time    Cholesterol, total 198 10/03/2019 12:28 PM    HDL Cholesterol 68 10/03/2019 12:28 PM    LDL, calculated 89 10/03/2019 12:28 PM    VLDL, calculated 41 (H) 10/03/2019 12:28 PM    Triglyceride 206 (H) 10/03/2019 12:28 PM      Thyroid Panel Lab Results   Component Value Date/Time    TSH 2.860 03/26/2019 09:07 AM    T4, Free 1.12 03/26/2019 09:07 AM        Most Recent UA Lab Results   Component Value Date/Time    Color YELLOW 05/08/2020 05:12 PM    Appearance CLEAR 05/08/2020 05:12 PM    Specific gravity 1.019 05/08/2020 05:12 PM    pH (UA) 5.5 05/08/2020 05:12 PM    Protein Negative 05/08/2020 05:12 PM    Glucose >1,000 05/08/2020 05:12 PM    Ketone Negative 05/08/2020 05:12 PM    Bilirubin Negative 05/08/2020 05:12 PM    Blood Negative 05/08/2020 05:12 PM    Urobilinogen 0.2 05/08/2020 05:12 PM    Nitrites Negative 05/08/2020 05:12 PM    Leukocyte Esterase Negative 05/08/2020 05:12 PM        Allergies   Allergen Reactions    Neosporin [Hydrocortisone] Other (comments)     Made rash worse when pt had the Shingles    Codeine Other (comments)     Headache      Sulfa (Sulfonamide Antibiotics) Other (comments)     SKIN EXCORIATION. ? ARREAGA NOBLE'S SYNDROME? Immunization History   Administered Date(s) Administered    Influenza High Dose Vaccine PF 09/26/2016, 09/24/2017, 08/21/2018, 08/26/2019    Influenza Vaccine 10/29/2013    Pneumococcal Conjugate (PCV-13) 03/09/2015    Pneumococcal Polysaccharide (PPSV-23) 08/23/2017    TB Skin Test (PPD) Intradermal 05/08/2020       All Labs from Last 24 Hrs:  Recent Results (from the past 24 hour(s))   GLUCOSE, POC    Collection Time: 05/09/20  3:36 PM   Result Value Ref Range    Glucose (POC) 184 (H) 65 - 100 mg/dL   GLUCOSE, POC    Collection Time: 05/09/20  4:18 PM   Result Value Ref Range    Glucose (POC) 121 (H) 65 - 100 mg/dL   GLUCOSE, POC    Collection Time: 05/09/20  9:06 PM   Result Value Ref Range    Glucose (POC) 149 (H) 65 - 100 mg/dL   PLEASE READ & DOCUMENT PPD TEST IN 24 HRS    Collection Time: 05/09/20  9:45 PM   Result Value Ref Range    PPD Negative Negative    mm Induration 0 0 - 5 mm   CBC WITH AUTOMATED DIFF    Collection Time: 05/10/20  4:30 AM   Result Value Ref Range    WBC 9.8 4.3 - 11.1 K/uL    RBC 4.21 4.05 - 5.2 M/uL    HGB 12.7 11.7 - 15.4 g/dL    HCT 39.5 35.8 - 46.3 %    MCV 93.8 79.6 - 97.8 FL    MCH 30.2 26.1 - 32.9 PG    MCHC 32.2 31.4 - 35.0 g/dL    RDW 13.9 11.9 - 14.6 %    PLATELET 982 759 - 831 K/uL    MPV 10.3 9.4 - 12.3 FL    ABSOLUTE NRBC 0.00 0.0 - 0.2 K/uL    DF AUTOMATED      NEUTROPHILS 70 43 - 78 %    LYMPHOCYTES 15 13 - 44 %    MONOCYTES 11 4.0 - 12.0 %    EOSINOPHILS 3 0.5 - 7.8 %    BASOPHILS 1 0.0 - 2.0 %    IMMATURE GRANULOCYTES 0 0.0 - 5.0 %    ABS. NEUTROPHILS 6.9 1.7 - 8.2 K/UL    ABS. LYMPHOCYTES 1.5 0.5 - 4.6 K/UL    ABS. MONOCYTES 1.1 0.1 - 1.3 K/UL    ABS. EOSINOPHILS 0.3 0.0 - 0.8 K/UL    ABS. BASOPHILS 0.1 0.0 - 0.2 K/UL    ABS. IMM.  GRANS. 0.0 0.0 - 0.5 K/UL   METABOLIC PANEL, BASIC    Collection Time: 05/10/20  4:30 AM   Result Value Ref Range    Sodium 140 136 - 145 mmol/L    Potassium 4.9 3.5 - 5.1 mmol/L    Chloride 107 98 - 107 mmol/L    CO2 28 21 - 32 mmol/L    Anion gap 5 (L) 7 - 16 mmol/L    Glucose 111 (H) 65 - 100 mg/dL    BUN 9 8 - 23 MG/DL    Creatinine 0.56 (L) 0.6 - 1.0 MG/DL    GFR est AA >60 >60 ml/min/1.73m2    GFR est non-AA >60 >60 ml/min/1.73m2    Calcium 8.3 8.3 - 10.4 MG/DL   GLUCOSE, POC    Collection Time: 05/10/20  7:24 AM   Result Value Ref Range    Glucose (POC) 95 65 - 100 mg/dL   GLUCOSE, POC    Collection Time: 05/10/20 11:07 AM   Result Value Ref Range    Glucose (POC) 109 (H) 65 - 100 mg/dL       Discharge Exam:  Patient Vitals for the past 24 hrs:   Temp Pulse Resp BP SpO2   05/10/20 1111 97.9 °F (36.6 °C) 88 17 147/73 96 %   05/10/20 0729 97.7 °F (36.5 °C) (!) 105 18 163/75 93 %   05/10/20 0540 97.9 °F (36.6 °C) 88 18 128/66 94 %   05/10/20 0057 97.5 °F (36.4 °C) 89 18 129/69 92 %   05/09/20 1936 98.9 °F (37.2 °C) (!) 101 20 134/73 94 %   05/09/20 1442 98.7 °F (37.1 °C) 93 18 117/56 92 %     Oxygen Therapy  O2 Sat (%): 96 % (05/10/20 1111)  Pulse via Oximetry: 109 beats per minute (05/08/20 1630)  O2 Device: Room air (05/09/20 1349)  O2 Flow Rate (L/min): 2 l/min (05/08/20 1425)  ETCO2 (mmHg): 2 mmHg (05/08/20 1704)    Intake/Output Summary (Last 24 hours) at 5/10/2020 1204  Last data filed at 5/9/2020 1816  Gross per 24 hour   Intake    Output 800 ml   Net -800 ml         Physical exam:  General:    Well nourished. Alert. No distress. Eyes:   Normal sclera. Extraocular movements intact. ENT:  Normocephalic, atraumatic. Moist mucous membranes  CV:   Regular rate and rhythm. No murmur, rub, or gallop. Lungs:  Clear to auscultation bilaterally. No wheezing, rhonchi, or rales. Abdomen: Soft, nontender, nondistended. Bowel sounds normal.   Extremities: Warm and dry. No cyanosis or edema. Neurologic: No focal deficits  Skin:     No rashes or jaundice. Psych:  Normal mood and affect.     Discharge Info:   Current Discharge Medication List      START taking these medications    Details   oxyCODONE IR (ROXICODONE) 10 mg tab immediate release tablet Take 1 Tab by mouth every four (4) hours as needed for Pain for up to 3 days. Max Daily Amount: 60 mg.  Qty: 18 Tab, Refills: 0    Associated Diagnoses: Closed nondisplaced intertrochanteric fracture of left femur, initial encounter ([facility])      ondansetron (ZOFRAN ODT) 4 mg disintegrating tablet Take 1 Tab by mouth every eight (8) hours as needed for Nausea or Nausea or Vomiting for up to 7 days. Qty: 21 Tab, Refills: 0         CONTINUE these medications which have NOT CHANGED    Details   DULoxetine (CYMBALTA) 30 mg capsule Take 1 Cap by mouth daily. Qty: 90 Cap, Refills: 3    Associated Diagnoses: Adjustment disorder with depressed mood      metFORMIN (GLUCOPHAGE) 500 mg tablet Take two tablets twice a day  Qty: 360 Tab, Refills: 3    Associated Diagnoses: Uncontrolled type 2 diabetes mellitus without complication, without long-term current use of insulin      diclofenac (VOLTAREN) 1 % gel Apply 4 g to affected area four (4) times daily. Qty: 100 g, Refills: 3    Associated Diagnoses: Chronic pain of both knees      empagliflozin (Jardiance) 10 mg tablet Take 1 Tab by mouth daily. Qty: 30 Tab, Refills: 5    Associated Diagnoses: Uncontrolled type 2 diabetes mellitus without complication, without long-term current use of insulin      pioglitazone (ACTOS) 15 mg tablet Take one a day  Qty: 90 Tab, Refills: 3    Associated Diagnoses: Uncontrolled type 2 diabetes mellitus without complication, without long-term current use of insulin      gabapentin (NEURONTIN) 300 mg capsule Take 2 capsules three times a day  Qty: 180 Cap, Refills: 11    Associated Diagnoses: Neuropathy      pravastatin (PRAVACHOL) 40 mg tablet Take 1 Tab by mouth nightly. Qty: 90 Tab, Refills: 3    Associated Diagnoses: Mixed hyperlipidemia      ramipril (ALTACE) 10 mg capsule Take 1 Cap by mouth daily.   Qty: 90 Cap, Refills: 3    Associated Diagnoses: Essential hypertension, benign      glucose blood VI test strips (ACCU-CHEK SIDDHARTH PLUS TEST STRP) strip Check blood sugar once a day   E11.65  Qty: 100 Strip, Refills: 3    Associated Diagnoses: Uncontrolled type 2 diabetes mellitus without complication, without long-term current use of insulin      lancets (ACCU-CHEK SOFTCLIX LANCETS) misc Check blood sugar once a day  E11.65  Qty: 100 Each, Refills: 3    Associated Diagnoses: Uncontrolled type 2 diabetes mellitus without complication, without long-term current use of insulin      OTHER Rollator with seat  Qty: 1 Units, Refills: 0    Associated Diagnoses: Hemiparesis affecting left side as late effect of stroke ([address] Pain in left hip; Primary osteoarthritis of left hip; Primary osteoarthritis of right knee      Blood-Glucose Meter monitoring kit Accuchek meter, strips lancets    Check blood sugar once a day.  250.00  Qty: 1 Kit, Refills: 0    Associated Diagnoses: Type II or unspecified type diabetes mellitus without mention of complication, not stated as uncontrolled      aspirin delayed-release 81 mg tablet Take  by mouth daily.                Disposition: home    Activity: Activity as tolerated  Diet: DIET DIABETIC CONSISTENT CARB Regular    Follow-up Appointments   Procedures    FOLLOW UP VISIT Appointment in: 3 - 5 Days PCP 2 week follow up ortho     PCP  2 week follow up ortho     Standing Status:   Standing     Number of Occurrences:   1     Order Specific Question:   Appointment in     Answer:   3 - 5 Days         Follow-up Information     Follow up With Specialties Details Why Contact Info    Tierney Narvaez MD [address] [place] [address]  [phone]      Liza Clemente MD Orthopedic Surgery Schedule an appointment as soon as possible for a visit 2 week post surgical follow up [address] [place] [address] [address] [address]  [phone]                 Time spent in patient discharge planning and coordination 35 minutes.     Signed:  Heriberto Monterroso NP

## 2020-05-10 NOTE — DISCHARGE INSTRUCTIONS
Patient Education        Surgery to Repair a Hip Fracture: What to Expect at the [facility].    After surgery to repair a hip fracture, you will spend a few hours in the recovery room, and then you will go to your hospital room. You may see a metal triangle called a trapeze over your bed. You can use this to help move yourself around in bed. You will be very tired and will want to rest. Your nurse may also help turn you as you rest.  You will probably still have a tube that drains urine from your bladder (urinary catheter), and you will probably be getting fluids through a tube in your vein called an IV. You may also have a drain near the cut (incision) on your hip. You may not feel hungry. You may feel sick to your stomach or constipated for a couple of days. This is common. Your nurse may give you stool softeners or laxatives to help with constipation. You may have stockings that put pressure on your legs to prevent blood clots. Your nurse may also give you medicines and exercise instructions to help prevent clots. Most people spend 2 to 4 days in the hospital. But depending on your health before the surgery, you may need to stay longer. This care sheet gives you a general idea about how your recovery will begin in the hospital. Each person has a different experience and recovers at a different pace. What will happen in the hospital?  Pain medicine  · For 1 to 3 days, you may have a needle hooked to your IV that gives you a safe dose of painkiller when you press a button. This will allow you to get pain relief when you need it without having to call a nurse. · When you no longer need the machine, your doctor will give you pain medicine that you take by mouth. Take it as needed, but remember that it is easier to prevent pain before it starts than to stop it once it has started. · If you are still in pain after you take your medicine, tell your nurse.  You may need new medicine or to get the medicine in a different form. · You may also have some mild pain in your back. Changing your position in bed may help. Other medicines  · Your doctor will probably give you blood thinners to prevent blood clots in your leg. You will take this medicine during your hospital stay and when you go home. Rehabilitation  · Your rehabilitation (rehab) will probably begin the day after your surgery. Your physical therapist will get you started. It may be painful to exercise at first, but your nurse will give you pain medicine if you need it. · Over the next few days, your physical therapist will help you walk, go up and down stairs, and get in and out of bed and chairs. He or she will help improve your movement (range of motion) and strength in your hip. · How quickly you regain strength and motion and do things on your own depends on how well you follow your physical therapy. Your physical therapist will teach you the exercises, but you must do them yourself. · An occupational therapist will work with you. He or she will teach you how to bathe, dress, and do daily activities. You may need tools to help with everyday activities. Tools include long-handled sponges, shower stools, and shoehorns. Diet  · You will get liquids at first, but you can begin to eat your normal diet when you feel you can. If your stomach is upset, your nurse will probably bring you bland, low-fat foods like plain rice, broiled chicken, toast, and yogurt. · You may have more fiber added to your meals to prevent constipation. Incision care  · You will have a bandage over your incision. Your nurse will care for this. Other instructions  · Your nurse or respiratory therapist will have you do breathing and coughing exercises to prevent problems such as pneumonia. Breathe in deeply through your nose, and slowly breathe out through your mouth. Do this 3 times, and then cough 2 times.   · You may have a device that you suck air through to help keep your lungs healthy (incentive spirometer). Use this as your nurse or respiratory therapist directs. When should you call for help? · You have trouble breathing.     · You have a cough, shortness of breath, or chest pain.     · You are sick to your stomach or cannot keep fluids down.     · You have signs of a blood clot, such as:  ? Pain in your calf, back of the knee, thigh, or groin. ? Redness and swelling in your leg or groin.     · You are in pain, or your pain does not get better after you take pain medicine.     · You have loose stitches, or your incision comes open.     · You have signs of infection, such as:  ? Increased pain, swelling, warmth, or redness. ? Red streaks leading from the incision. ? Pus draining from the incision. ? A fever. Where can you learn more? Go to http://patrick-raven.info/  Enter J339 in the search box to learn more about \"Surgery to Repair a Hip Fracture: What to Expect at the Hospital.\"  Current as of: June 26, 2019Content Version: 12.4  © 4407-5514 [employer]. Care instructions adapted under license by [employer] (which disclaims liability or warranty for this information). If you have questions about a medical condition or this instruction, always ask your healthcare professional. [place] [address] any warranty or liability for your use of this information.

## 2020-05-10 NOTE — CONSULTS
Admit Date: 5/8/2020  Referring NP:  Rupali Rushing Making/Plan/Recommend: Medical chart reviewed. Briefly, this is an 83 YO female with PMH of CVA in 2010 with residual L sided weakess, increased tone and impaired coordination, HTN, DM II, neuropathy who fell at home sustaining a non-operative L greater trochanter fracture. Ortho consulted and allowed to be WBAT for transfers only. PLOF - ambulated with cane and rollator, functioning at modified independent level. Seen by therapy with current level of function: mobility at min - mod A level. Will need wc for mobility at discharge. Therapists and medical team report for patient desiring to return home with family and declining acute rehab. Scheduled to be d/hoang home today with continued home health therapy. Thank you for the opportunity to participate in the care of this patient.

## 2020-05-10 NOTE — PROGRESS NOTES
Problem: Self Care Deficits Care Plan (Adult)  Goal: *Acute Goals and Plan of Care (Insert Text)  Description: 1. Patient will complete lower body bathing and dressing with minimal assistance and adaptive equipment as needed. 2. Patient will complete toileting with CGA. 3. Patient will tolerate 30 minutes of OT treatment with 2-3 rest breaks to increase activity tolerance for ADLs. 4. Patient will complete functional transfers with CGA and adaptive equipment as needed. 5. Patient will participate in upper body exercises for 8 minutes to improve strength for ADL/functional transfers. Timeframe: 7 visits      Outcome: Progressing Towards Goal     OCCUPATIONAL THERAPY: Daily Note and AM 5/10/2020  INPATIENT: OT Visit Days: 2  Payor: Amber Stoddard / Plan: [address] / Product Type: Managed Care Medicare /   L LE  WBAT for transfers only; non-operative fx   NAME/AGE/GENDER: Faith Ng is a 82 y.o. female   PRIMARY DIAGNOSIS:  Hip fracture ([facility] [address] [S72.009A] Intertrochanteric fracture of left hip ([facility] [address] Intertrochanteric fracture of left hip (HCC)  Procedure(s) (LRB):  FEMUR INSERTION INTRA MEDULLARY NAIL (Left)     ICD-10: Treatment Diagnosis:    · Pain in left hip (M25.552)  · Stiffness of Left Hip, Not elsewhere classified (M25.652)  · Generalized Muscle Weakness (M62.81)  · Other lack of cordination (R27.8)  · History of falling (Z91.81)   Precautions/Allergies:     Neosporin [hydrocortisone]; Codeine; and Sulfa (sulfonamide antibiotics)      ASSESSMENT:     Ms. Genesis Heart presents to the hospital with L non-operative hip fracture. Pt is WBAT in the L LE for transfers only. Pt lives with her daughter and son-in-law at baseline. Pt is typically independent to modified independent with functional mobility using a cane on occasion. Pt had recent fall in her yard. Pt completes her own ADLs and was still assisting with some cooking/cleaning.  Pt is home alone while her family is at work. Pt is supine in the bed upon arrival and reports \"12\"/10 pain in her L hip. Pt has hx of CVA with increased tone on her L side with residual weakness, stiffness, and impaired coordination. Pt reports some shoulder pain as well after falling to her L. Pt needed total assistance to don socks in the bed and maximal assistance x 2 for bed mobility due to pain levels. Pt did much better once sitting edge of bed and was able to scoot to the edge of the bed CGA. Pt completed standing to the walker with minimal assistance x 2. Pt required additional time to flatten her L foot (reports this happens at baseline too). Pt left up at the end of the session working with PT. Pt is currently functioning below baseline for ADL/functional transfers and will benefit from OT services to address stated goals and plan of care. 5/10/2020 Pt was sitting EOB eating lunch upon arrival. Pt completed dressing with the assistance listed below. Pt completed functional transfer with Brendon using rolling walker. Pt is progressing towards goals. Continue POC. This section established at most recent assessment   PROBLEM LIST (Impairments causing functional limitations):  1. Decreased Strength  2. Decreased ADL/Functional Activities  3. Decreased Transfer Abilities  4. Decreased Ambulation Ability/Technique  5. Decreased Balance  6. Increased Pain  7. Decreased Activity Tolerance  8. Decreased Flexibility/Joint Mobility  9. Decreased [place] with Home Exercise Program   INTERVENTIONS PLANNED: (Benefits and precautions of occupational therapy have been discussed with the patient.)  1. Activities of daily living training  2. Adaptive equipment training  3. Balance training  4. Clothing management  5. Donning&doffing training  6. Neuromuscular re-eduation  7. Therapeutic activity  8. Therapeutic exercise     TREATMENT PLAN: Frequency/Duration: Follow patient 3 times to address above goals.   Rehabilitation Potential For Stated Goals: Excellent     REHAB RECOMMENDATIONS (at time of discharge pending progress):    Placement: It is my opinion, based on this patient's performance to date, that Ms. Waylon Bauman may benefit from [address] after discharge due to the functional deficits listed above that are likely to improve with skilled rehabilitation because he/she has multiple medical issues that affect his/her functional mobility in the community. Pt will need 24 hr supervision at home. Equipment:    TBD               OCCUPATIONAL PROFILE AND HISTORY:   History of Present Injury/Illness (Reason for Referral):  See H&P  Past Medical History/Comorbidities:   Ms. Waylon Bauman  has a past medical history of Colon polyps, Goiter, Hemiparesis affecting left side as late effect of stroke ([facility] [address] (2010), Hiatal hernia, History of stroke (2010), Hyperlipidemia (6/3/2014), Hypertension, Neuropathy (10/29/2013), Non-insulin dependent type 2 diabetes mellitus ([facility] [address] (2/21/2017), and Osteopenia (05/08/2020). Ms. Waylon Bauman  has a past surgical history that includes hx partial thyroidectomy; hx kati and bso; and hx gi. Social History/Living Environment:   Home Environment: Private residence  # Steps to Enter: 0  One/Two Story Residence: One story  Living Alone: No  Support Systems: Child(carlin)(dtr and KELIN)  Patient Expects to be Discharged to[de-identified] Private residence  Current DME Used/Available at Home: [employer], straight, Commode, bedside, Grab bars, Shower chair, Walker, rollator  Tub or Shower Type: Shower  Prior Level of Function/Work/Activity:  Pt lives with her daughter and son-in-law at baseline. Pt is typically independent to modified independent with functional mobility using a cane on occasion. Pt had recent fall in her yard. Pt completes her own ADLs and was still assisting with some cooking/cleaning. Pt is home alone while her family is at work.   Personal Factors:          Social Background:  home alone while children were at work        Past/Current Experience:  hx of cva with L sided residual deficits; new non-operative hip fx        Other factors that influence how disability is experienced by the patient:  multiple co-morbidities    Number of Personal Factors/Comorbidities that affect the Plan of Care: Extensive review of physical, cognitive, and psychosocial performance (3+):  HIGH COMPLEXITY   ASSESSMENT OF OCCUPATIONAL PERFORMANCE[de-identified]   Activities of Daily Living:   Basic ADLs (From Assessment) Complex ADLs (From Assessment)   Feeding: Setup  Oral Facial Hygiene/Grooming: Stand-by assistance  Bathing: Moderate assistance  Upper Body Dressing: Minimum assistance  Lower Body Dressing: Maximum assistance  Toileting: Maximum assistance Instrumental ADL  Meal Preparation: Total assistance  Homemaking:  Total assistance   Grooming/Bathing/Dressing Activities of Daily Living                     Upper Body Dressing Assistance  Dressing Assistance: Independent  Bra: Independent  Pullover Shirt: Independent     Lower Body Dressing Assistance  Dressing Assistance: Minimum assistance  Underpants: Minimum assistance  Pants With Elastic Waist: Minimum assistance  Socks: Minimum assistance  Slip on Shoes with Back: Minimum assistance  Cues: Doff;Don;Physical assistance Bed/Mat Mobility  Sit to Stand: Modified independent  Bed to Chair: Modified independent     Most Recent Physical Functioning:   Gross Assessment:                  Posture:  Posture (WDL): Exceptions to WDL  Posture Assessment: Rounded shoulders  Balance:  Sitting: Intact  Standing: Intact Bed Mobility:     Wheelchair Mobility:     Transfers:  Sit to Stand: Modified independent  Bed to Chair: Modified independent            Patient Vitals for the past 6 hrs:   BP BP Patient Position SpO2 Pulse   05/10/20 0729 163/75 Post activity 93 % (!) 105   05/10/20 1111 147/73 At rest 96 % 88       Mental Status  Neurologic State: Alert, Eyes open spontaneously  Orientation Level: Oriented X4  Cognition: Follows commands  Perception: Appears intact  Perseveration: No perseveration noted  Safety/Judgement: Awareness of environment, Fall prevention                          Physical Skills Involved:  1. Range of Motion  2. Balance  3. Strength  4. Activity Tolerance  5. Pain (acute) Cognitive Skills Affected (resulting in the inability to perform in a timely and safe manner):  1. None  Psychosocial Skills Affected:  1. Habits/Routines   Number of elements that affect the Plan of Care: 5+:  HIGH COMPLEXITY   CLINICAL DECISION MAKIN[address] [place] [address] AM-PAC 6 Clicks   Daily Activity Inpatient Short Form  How much help from another person does the patient currently need. .. Total A Lot A Little None   1. Putting on and taking off regular lower body clothing? [] 1   [x] 2   [] 3   [] 4   2. Bathing (including washing, rinsing, drying)? [] 1   [x] 2   [] 3   [] 4   3. Toileting, which includes using toilet, bedpan or urinal?   [] 1   [x] 2   [] 3   [] 4   4. Putting on and taking off regular upper body clothing? [] 1   [] 2   [x] 3   [] 4   5. Taking care of personal grooming such as brushing teeth? [] 1   [] 2   [x] 3   [] 4   6. Eating meals? [] 1   [] 2   [x] 3   [] 4   © , Trustees of [address] [place] [address], under license to [employer]. All rights reserved      Score:  Initial: 15 Most Recent: X (Date: -- )    Interpretation of Tool:  Represents activities that are increasingly more difficult (i.e. Bed mobility, Transfers, Gait). Medical Necessity:     · Patient demonstrates   · good and excellent  ·  rehab potential due to higher previous functional level. Reason for Services/Other Comments:  · Patient continues to require skilled intervention due to   · Decreased independence with ADL/functional transfers.     · .   Use of outcome tool(s) and clinical judgement create a POC that gives a: LOW COMPLEXITY         TREATMENT:   (In addition to Assessment/Re-Assessment sessions the following treatments were rendered)     Pre-treatment Symptoms/Complaints:    Pain: Initial:   Pain Intensity 1: 5  Pain Location 1: Hip  Pain Intervention(s) 1: Repositioned  Post Session:  same     Self Care: (14): Procedure(s) (per grid) utilized to improve and/or restore self-care/home management as related to dressing. Required min verbal and manual cueing to facilitate activities of daily living skills. Braces/Orthotics/Lines/Etc:   · IV  Treatment/Session Assessment:    · Response to Treatment:  Pt tolerated it pain in her L hip with transfers. · Interdisciplinary Collaboration:   o Certified Occupational Therapy Assistant  o Registered Nurse  · After treatment position/precautions:   o Up in chair  o Bed alarm/tab alert on  o Bed/Chair-wheels locked  o Call light within reach  o RN notified   · Compliance with Program/Exercises: Will assess as treatment progresses. · Recommendations/Intent for next treatment session: \"Next visit will focus on advancements to more challenging activities and reduction in assistance provided\".   Total Treatment Duration:  OT Patient Time In/Time Out  Time In: 1136  Time Out: Professor Kaplan [place] [address]

## 2020-05-10 NOTE — PROGRESS NOTES
Discharge instructions discussed with patient. She verbalized understanding of instructions and need to  two scripts at her [facility]. Awaiting w/c delivery. Patient's daughter aware of need for pickup after WC delivery.

## 2020-05-10 NOTE — PROGRESS NOTES
Patient will be d/c home today with [facility]. Patient has wheelchair deliveried from [facility]  Patient / daughter is agreeable to the d/c home today. Family will transport patient home. CM will continue to monitor. Care Management Interventions  PCP Verified by CM: Paxton Gomez MD)  Mode of Transport at Discharge:  Other (see comment)(family)  Transition of Care Consult (CM Consult): Discharge Planning, Home Health(Daughter requested [facility])  Discharge Durable Medical Equipment: Yes(wheelchair was ordered from [facility])  Physical Therapy Consult: Yes  Occupational Therapy Consult: Yes  Speech Therapy Consult: No  Current Support Network: Own Home, Relative's Home(Patient lives with daughter / son-in-law)  Confirm Follow Up Transport: Family  The Plan for Transition of Care is Related to the Following Treatment Goals : patient will regain strength to return back to baseline  The Patient and/or Patient Representative was Provided with a Choice of Provider and Agrees with the Discharge Plan?: Yes(Spoke with patient daughter Jayson Avalos))  Name of the Patient Representative Who was Provided with a Choice of Provider and Agrees with the Discharge Plan: patient / daughter Jayson Avalos)  Freedom of Choice List was Provided with Basic Dialogue that Supports the Patient's Individualized Plan of Care/Goals, Treatment Preferences and Shares the Quality Data Associated with the Providers?: Yes   Resource Information Provided?: No  Discharge Location  Discharge Placement: Home with home health(Patient will be active with [facility])

## 2020-05-11 ENCOUNTER — PATIENT OUTREACH (OUTPATIENT)
Dept: CASE MANAGEMENT | Age: 85
End: 2020-05-11

## 2020-05-11 NOTE — PROGRESS NOTES
Patient contacted regarding recent visit for viral symptoms. This Janet Mckee contacted the family by telephone to perform post discharge call. Verified name and  with family as identifiers. Provided introduction to self, and reason for call due to viral symptoms of infection and/or exposure to COVID-19. Patient presented to emergency department/flu clinic with complaints of viral symptoms/exposure to COVID. Patient reports symptoms are the same. Due to no new or worsening symptoms the [facility] Care Coordinator was not notified for escalation. Discussed exposure protocols and quarantine with CDC Guidelines What To Do If You Are Sick    Family was given an opportunity for questions and concerns. Stay home except to get medical care  Separate yourself from other people and animals in your home  Call ahead before visiting your doctor  Wear a facemask  Cover your coughs and sneezes  Clean your hands often  Avoid sharing personal household items  Clean all high-touch surfaces everyday    Monitor your symptoms  Seek prompt medical attention if your illness is worsening (e.g., difficulty breathing). Before seeking care, call your healthcare provider and tell them that you have, or are being evaluated for, COVID-19. Put on a facemask before you enter the facility. These steps will help the healthcare provider's office to keep other people in the office or waiting room from getting infected or exposed. Ask your healthcare provider to call the local or [facility] health department. Persons who are placed under If you have a medical emergency and need to call 911, notify the dispatch personnel that you have, or are being evaluated for COVID-19. If possible, put on a facemask before emergency medical services arrive.     The family agrees to contact the [facility] exposure line [phone], local health department [facility] [place] [facility] [address] [place] [address]: ([phone]) and PCP office for questions related to their healthcare. Author provided contact information for future reference. Patient/family/caregiver given information for [employer] and agrees to enroll yes  Patient preferred e-mail: [email or web address]  This is patients daughter-in-law Sneha Langston email address  Patient preferred phone contact: [phone]    Based on Loop alert triggers, patient will be contacted by nurse care manager for worsening symptoms.     Will follow up in 7 - 14 days

## 2020-05-12 ENCOUNTER — HOME CARE VISIT (OUTPATIENT)
Dept: SCHEDULING | Facility: HOME HEALTH | Age: 85
End: 2020-05-12
Payer: MEDICARE

## 2020-05-12 ENCOUNTER — HOSPITAL ENCOUNTER (OUTPATIENT)
Dept: LAB | Age: 85
Discharge: HOME | End: 2020-05-12
Payer: MEDICARE

## 2020-05-12 VITALS
SYSTOLIC BLOOD PRESSURE: 102 MMHG | RESPIRATION RATE: 16 BRPM | DIASTOLIC BLOOD PRESSURE: 60 MMHG | HEART RATE: 88 BPM | TEMPERATURE: 97.9 F | OXYGEN SATURATION: 95 %

## 2020-05-12 VITALS
HEART RATE: 74 BPM | RESPIRATION RATE: 16 BRPM | TEMPERATURE: 97.4 F | DIASTOLIC BLOOD PRESSURE: 64 MMHG | SYSTOLIC BLOOD PRESSURE: 128 MMHG | OXYGEN SATURATION: 97 %

## 2020-05-12 LAB
EST. AVERAGE GLUCOSE BLD GHB EST-MCNC: 157 MG/DL
HBA1C MFR BLD: 7.1 % (ref 4.8–6)

## 2020-05-12 PROCEDURE — 400013 HH SOC

## 2020-05-12 PROCEDURE — 83036 HEMOGLOBIN GLYCOSYLATED A1C: CPT

## 2020-05-12 PROCEDURE — G0152 HHCP-SERV OF OT,EA 15 MIN: HCPCS

## 2020-05-12 PROCEDURE — G0299 HHS/HOSPICE OF RN EA 15 MIN: HCPCS

## 2020-05-14 ENCOUNTER — HOME CARE VISIT (OUTPATIENT)
Dept: SCHEDULING | Facility: HOME HEALTH | Age: 85
End: 2020-05-14
Payer: MEDICARE

## 2020-05-14 VITALS
RESPIRATION RATE: 18 BRPM | SYSTOLIC BLOOD PRESSURE: 151 MMHG | HEART RATE: 78 BPM | TEMPERATURE: 97.3 F | DIASTOLIC BLOOD PRESSURE: 88 MMHG

## 2020-05-14 PROCEDURE — G0151 HHCP-SERV OF PT,EA 15 MIN: HCPCS

## 2020-05-15 ENCOUNTER — HOME CARE VISIT (OUTPATIENT)
Dept: SCHEDULING | Facility: HOME HEALTH | Age: 85
End: 2020-05-15
Payer: MEDICARE

## 2020-05-15 PROCEDURE — G0151 HHCP-SERV OF PT,EA 15 MIN: HCPCS

## 2020-05-17 VITALS
DIASTOLIC BLOOD PRESSURE: 78 MMHG | SYSTOLIC BLOOD PRESSURE: 142 MMHG | HEART RATE: 82 BPM | RESPIRATION RATE: 18 BRPM | TEMPERATURE: 98.2 F

## 2020-05-18 ENCOUNTER — HOME CARE VISIT (OUTPATIENT)
Dept: SCHEDULING | Facility: HOME HEALTH | Age: 85
End: 2020-05-18
Payer: MEDICARE

## 2020-05-18 ENCOUNTER — HOME CARE VISIT (OUTPATIENT)
Dept: HOME HEALTH SERVICES | Facility: HOME HEALTH | Age: 85
End: 2020-05-18
Payer: MEDICARE

## 2020-05-18 PROCEDURE — G0157 HHC PT ASSISTANT EA 15: HCPCS

## 2020-05-18 PROCEDURE — G0158 HHC OT ASSISTANT EA 15: HCPCS

## 2020-05-19 VITALS
RESPIRATION RATE: 18 BRPM | SYSTOLIC BLOOD PRESSURE: 118 MMHG | TEMPERATURE: 97.1 F | DIASTOLIC BLOOD PRESSURE: 62 MMHG | HEART RATE: 98 BPM

## 2020-05-20 ENCOUNTER — HOME CARE VISIT (OUTPATIENT)
Dept: SCHEDULING | Facility: HOME HEALTH | Age: 85
End: 2020-05-20
Payer: MEDICARE

## 2020-05-20 VITALS
HEART RATE: 95 BPM | RESPIRATION RATE: 18 BRPM | DIASTOLIC BLOOD PRESSURE: 68 MMHG | OXYGEN SATURATION: 96 % | SYSTOLIC BLOOD PRESSURE: 128 MMHG | TEMPERATURE: 97.3 F

## 2020-05-20 PROCEDURE — G0157 HHC PT ASSISTANT EA 15: HCPCS

## 2020-05-20 PROCEDURE — G0299 HHS/HOSPICE OF RN EA 15 MIN: HCPCS

## 2020-05-21 ENCOUNTER — HOME CARE VISIT (OUTPATIENT)
Dept: SCHEDULING | Facility: HOME HEALTH | Age: 85
End: 2020-05-21
Payer: MEDICARE

## 2020-05-21 VITALS
TEMPERATURE: 98 F | DIASTOLIC BLOOD PRESSURE: 60 MMHG | RESPIRATION RATE: 16 BRPM | HEART RATE: 78 BPM | SYSTOLIC BLOOD PRESSURE: 100 MMHG

## 2020-05-21 PROCEDURE — G0158 HHC OT ASSISTANT EA 15: HCPCS

## 2020-05-22 VITALS
TEMPERATURE: 97.2 F | HEART RATE: 80 BPM | RESPIRATION RATE: 18 BRPM | SYSTOLIC BLOOD PRESSURE: 120 MMHG | DIASTOLIC BLOOD PRESSURE: 62 MMHG

## 2020-05-25 ENCOUNTER — HOME CARE VISIT (OUTPATIENT)
Dept: SCHEDULING | Facility: HOME HEALTH | Age: 85
End: 2020-05-25
Payer: MEDICARE

## 2020-05-25 VITALS
HEART RATE: 69 BPM | TEMPERATURE: 97.9 F | DIASTOLIC BLOOD PRESSURE: 65 MMHG | SYSTOLIC BLOOD PRESSURE: 111 MMHG | RESPIRATION RATE: 16 BRPM

## 2020-05-25 PROCEDURE — G0157 HHC PT ASSISTANT EA 15: HCPCS

## 2020-05-25 PROCEDURE — G0158 HHC OT ASSISTANT EA 15: HCPCS

## 2020-05-26 VITALS
DIASTOLIC BLOOD PRESSURE: 60 MMHG | SYSTOLIC BLOOD PRESSURE: 110 MMHG | HEART RATE: 96 BPM | TEMPERATURE: 97.9 F | RESPIRATION RATE: 18 BRPM

## 2020-05-27 ENCOUNTER — HOME CARE VISIT (OUTPATIENT)
Dept: SCHEDULING | Facility: HOME HEALTH | Age: 85
End: 2020-05-27
Payer: MEDICARE

## 2020-05-27 VITALS
HEART RATE: 70 BPM | SYSTOLIC BLOOD PRESSURE: 114 MMHG | TEMPERATURE: 97.8 F | OXYGEN SATURATION: 99 % | DIASTOLIC BLOOD PRESSURE: 62 MMHG | RESPIRATION RATE: 16 BRPM

## 2020-05-27 VITALS
HEART RATE: 74 BPM | TEMPERATURE: 97.4 F | DIASTOLIC BLOOD PRESSURE: 60 MMHG | OXYGEN SATURATION: 98 % | SYSTOLIC BLOOD PRESSURE: 114 MMHG | RESPIRATION RATE: 18 BRPM

## 2020-05-27 PROCEDURE — G0157 HHC PT ASSISTANT EA 15: HCPCS

## 2020-05-27 PROCEDURE — G0152 HHCP-SERV OF OT,EA 15 MIN: HCPCS

## 2020-05-27 PROCEDURE — G0299 HHS/HOSPICE OF RN EA 15 MIN: HCPCS

## 2020-05-29 VITALS
TEMPERATURE: 97.4 F | RESPIRATION RATE: 18 BRPM | SYSTOLIC BLOOD PRESSURE: 114 MMHG | HEART RATE: 74 BPM | DIASTOLIC BLOOD PRESSURE: 60 MMHG

## 2020-05-30 ENCOUNTER — PATIENT OUTREACH (OUTPATIENT)
Dept: CASE MANAGEMENT | Age: 85
End: 2020-05-30

## 2020-05-30 NOTE — PROGRESS NOTES
2nd Covid-19 At Risk Education call, no answer, left message on voicemail to please return my call. Episode will be resolved at this time.  Will be reopened if receive returned call

## 2020-06-02 ENCOUNTER — HOME CARE VISIT (OUTPATIENT)
Dept: SCHEDULING | Facility: HOME HEALTH | Age: 85
End: 2020-06-02
Payer: MEDICARE

## 2020-06-02 PROCEDURE — G0157 HHC PT ASSISTANT EA 15: HCPCS

## 2020-06-03 ENCOUNTER — HOME CARE VISIT (OUTPATIENT)
Dept: SCHEDULING | Facility: HOME HEALTH | Age: 85
End: 2020-06-03
Payer: MEDICARE

## 2020-06-03 VITALS
HEART RATE: 80 BPM | RESPIRATION RATE: 18 BRPM | TEMPERATURE: 97.2 F | SYSTOLIC BLOOD PRESSURE: 130 MMHG | DIASTOLIC BLOOD PRESSURE: 62 MMHG

## 2020-06-03 PROCEDURE — G0151 HHCP-SERV OF PT,EA 15 MIN: HCPCS

## 2020-06-05 VITALS
DIASTOLIC BLOOD PRESSURE: 74 MMHG | SYSTOLIC BLOOD PRESSURE: 124 MMHG | TEMPERATURE: 97.3 F | HEART RATE: 77 BPM | RESPIRATION RATE: 18 BRPM

## 2020-06-08 ENCOUNTER — HOME CARE VISIT (OUTPATIENT)
Dept: SCHEDULING | Facility: HOME HEALTH | Age: 85
End: 2020-06-08
Payer: MEDICARE

## 2020-06-08 VITALS
TEMPERATURE: 97.4 F | DIASTOLIC BLOOD PRESSURE: 74 MMHG | RESPIRATION RATE: 18 BRPM | SYSTOLIC BLOOD PRESSURE: 120 MMHG | HEART RATE: 68 BPM

## 2020-06-08 PROCEDURE — G0157 HHC PT ASSISTANT EA 15: HCPCS

## 2020-06-12 ENCOUNTER — HOME CARE VISIT (OUTPATIENT)
Dept: SCHEDULING | Facility: HOME HEALTH | Age: 85
End: 2020-06-12
Payer: MEDICARE

## 2020-06-12 PROCEDURE — 400013 HH SOC

## 2020-06-12 PROCEDURE — G0157 HHC PT ASSISTANT EA 15: HCPCS

## 2020-06-14 VITALS
RESPIRATION RATE: 18 BRPM | DIASTOLIC BLOOD PRESSURE: 60 MMHG | TEMPERATURE: 97.2 F | SYSTOLIC BLOOD PRESSURE: 122 MMHG | HEART RATE: 68 BPM

## 2020-06-15 ENCOUNTER — HOME CARE VISIT (OUTPATIENT)
Dept: SCHEDULING | Facility: HOME HEALTH | Age: 85
End: 2020-06-15
Payer: MEDICARE

## 2020-06-15 ENCOUNTER — HOME CARE VISIT (OUTPATIENT)
Dept: HOME HEALTH SERVICES | Facility: HOME HEALTH | Age: 85
End: 2020-06-15
Payer: MEDICARE

## 2020-06-15 VITALS
RESPIRATION RATE: 18 BRPM | DIASTOLIC BLOOD PRESSURE: 60 MMHG | TEMPERATURE: 97.1 F | SYSTOLIC BLOOD PRESSURE: 118 MMHG | HEART RATE: 66 BPM

## 2020-06-15 PROCEDURE — G0157 HHC PT ASSISTANT EA 15: HCPCS

## 2020-06-19 ENCOUNTER — HOME CARE VISIT (OUTPATIENT)
Dept: SCHEDULING | Facility: HOME HEALTH | Age: 85
End: 2020-06-19
Payer: MEDICARE

## 2020-06-19 PROCEDURE — G0151 HHCP-SERV OF PT,EA 15 MIN: HCPCS

## 2020-06-21 VITALS
RESPIRATION RATE: 19 BRPM | HEART RATE: 81 BPM | TEMPERATURE: 98 F | SYSTOLIC BLOOD PRESSURE: 124 MMHG | DIASTOLIC BLOOD PRESSURE: 72 MMHG

## 2022-03-19 PROBLEM — E11.40 TYPE 2 DIABETES MELLITUS WITH DIABETIC NEUROPATHY (HCC): Status: ACTIVE | Noted: 2018-09-26

## 2022-03-19 PROBLEM — Z86.73 HISTORY OF STROKE: Status: ACTIVE | Noted: 2020-05-08

## 2022-03-19 PROBLEM — K57.92 DIVERTICULITIS: Status: ACTIVE | Noted: 2020-04-13

## 2022-03-19 PROBLEM — S72.142A INTERTROCHANTERIC FRACTURE OF LEFT HIP (HCC): Status: ACTIVE | Noted: 2020-05-08

## 2022-03-19 PROBLEM — M85.80 OSTEOPENIA: Status: ACTIVE | Noted: 2020-05-08

## 2022-03-19 PROBLEM — E11.9 NON-INSULIN DEPENDENT TYPE 2 DIABETES MELLITUS (HCC): Status: ACTIVE | Noted: 2017-02-21

## 2022-07-29 SDOH — HEALTH STABILITY: PHYSICAL HEALTH: ON AVERAGE, HOW MANY DAYS PER WEEK DO YOU ENGAGE IN MODERATE TO STRENUOUS EXERCISE (LIKE A BRISK WALK)?: 0 DAYS

## 2022-07-29 SDOH — HEALTH STABILITY: PHYSICAL HEALTH: ON AVERAGE, HOW MANY MINUTES DO YOU ENGAGE IN EXERCISE AT THIS LEVEL?: 0 MIN

## 2022-07-29 ASSESSMENT — PATIENT HEALTH QUESTIONNAIRE - PHQ9
SUM OF ALL RESPONSES TO PHQ QUESTIONS 1-9: 0
2. FEELING DOWN, DEPRESSED OR HOPELESS: 0
SUM OF ALL RESPONSES TO PHQ QUESTIONS 1-9: 0
1. LITTLE INTEREST OR PLEASURE IN DOING THINGS: 0
SUM OF ALL RESPONSES TO PHQ9 QUESTIONS 1 & 2: 0

## 2022-07-29 ASSESSMENT — LIFESTYLE VARIABLES
HOW OFTEN DO YOU HAVE A DRINK CONTAINING ALCOHOL: 1
HOW MANY STANDARD DRINKS CONTAINING ALCOHOL DO YOU HAVE ON A TYPICAL DAY: 0
HOW MANY STANDARD DRINKS CONTAINING ALCOHOL DO YOU HAVE ON A TYPICAL DAY: PATIENT DOES NOT DRINK
HOW OFTEN DO YOU HAVE A DRINK CONTAINING ALCOHOL: NEVER
HOW OFTEN DO YOU HAVE SIX OR MORE DRINKS ON ONE OCCASION: 1

## 2022-08-02 ENCOUNTER — TELEPHONE (OUTPATIENT)
Dept: FAMILY MEDICINE CLINIC | Facility: CLINIC | Age: 87
End: 2022-08-02

## 2022-08-02 DIAGNOSIS — I10 ESSENTIAL HYPERTENSION, BENIGN: Primary | ICD-10-CM

## 2022-08-02 DIAGNOSIS — G62.9 NEUROPATHY: ICD-10-CM

## 2022-08-02 DIAGNOSIS — E78.2 MIXED HYPERLIPIDEMIA: ICD-10-CM

## 2022-08-02 DIAGNOSIS — E11.40 TYPE 2 DIABETES MELLITUS WITH DIABETIC NEUROPATHY, WITHOUT LONG-TERM CURRENT USE OF INSULIN (HCC): ICD-10-CM

## 2022-08-05 ENCOUNTER — NURSE ONLY (OUTPATIENT)
Dept: FAMILY MEDICINE CLINIC | Facility: CLINIC | Age: 87
End: 2022-08-05

## 2022-08-05 DIAGNOSIS — E78.2 MIXED HYPERLIPIDEMIA: ICD-10-CM

## 2022-08-05 DIAGNOSIS — I10 ESSENTIAL HYPERTENSION, BENIGN: ICD-10-CM

## 2022-08-05 DIAGNOSIS — E11.40 TYPE 2 DIABETES MELLITUS WITH DIABETIC NEUROPATHY, WITHOUT LONG-TERM CURRENT USE OF INSULIN (HCC): ICD-10-CM

## 2022-08-05 DIAGNOSIS — G62.9 NEUROPATHY: ICD-10-CM

## 2022-08-05 LAB
ALBUMIN SERPL-MCNC: 4.2 G/DL (ref 3.2–4.6)
ALBUMIN/GLOB SERPL: 1.6 {RATIO} (ref 1.2–3.5)
ALP SERPL-CCNC: 56 U/L (ref 50–136)
ALT SERPL-CCNC: 22 U/L (ref 12–65)
ANION GAP SERPL CALC-SCNC: 2 MMOL/L (ref 7–16)
AST SERPL-CCNC: 19 U/L (ref 15–37)
BASOPHILS # BLD: 0.1 K/UL (ref 0–0.2)
BASOPHILS NFR BLD: 1 % (ref 0–2)
BILIRUB SERPL-MCNC: 0.4 MG/DL (ref 0.2–1.1)
BUN SERPL-MCNC: 12 MG/DL (ref 8–23)
CALCIUM SERPL-MCNC: 9.3 MG/DL (ref 8.3–10.4)
CHLORIDE SERPL-SCNC: 107 MMOL/L (ref 98–107)
CHOLEST SERPL-MCNC: 200 MG/DL
CO2 SERPL-SCNC: 28 MMOL/L (ref 21–32)
CREAT SERPL-MCNC: 0.8 MG/DL (ref 0.6–1)
DIFFERENTIAL METHOD BLD: ABNORMAL
EOSINOPHIL # BLD: 0.3 K/UL (ref 0–0.8)
EOSINOPHIL NFR BLD: 3 % (ref 0.5–7.8)
ERYTHROCYTE [DISTWIDTH] IN BLOOD BY AUTOMATED COUNT: 13.3 % (ref 11.9–14.6)
EST. AVERAGE GLUCOSE BLD GHB EST-MCNC: 169 MG/DL
GLOBULIN SER CALC-MCNC: 2.7 G/DL (ref 2.3–3.5)
GLUCOSE SERPL-MCNC: 173 MG/DL (ref 65–100)
HBA1C MFR BLD: 7.5 % (ref 4.8–5.6)
HCT VFR BLD AUTO: 42.4 % (ref 35.8–46.3)
HDLC SERPL-MCNC: 70 MG/DL (ref 40–60)
HDLC SERPL: 2.9 {RATIO}
HGB BLD-MCNC: 14.5 G/DL (ref 11.7–15.4)
IMM GRANULOCYTES # BLD AUTO: 0 K/UL (ref 0–0.5)
IMM GRANULOCYTES NFR BLD AUTO: 0 % (ref 0–5)
LDLC SERPL CALC-MCNC: 89.6 MG/DL
LYMPHOCYTES # BLD: 1.8 K/UL (ref 0.5–4.6)
LYMPHOCYTES NFR BLD: 19 % (ref 13–44)
MCH RBC QN AUTO: 33.8 PG (ref 26.1–32.9)
MCHC RBC AUTO-ENTMCNC: 34.2 G/DL (ref 31.4–35)
MCV RBC AUTO: 98.8 FL (ref 79.6–97.8)
MONOCYTES # BLD: 0.9 K/UL (ref 0.1–1.3)
MONOCYTES NFR BLD: 10 % (ref 4–12)
NEUTS SEG # BLD: 6.2 K/UL (ref 1.7–8.2)
NEUTS SEG NFR BLD: 67 % (ref 43–78)
NRBC # BLD: 0 K/UL (ref 0–0.2)
PLATELET # BLD AUTO: 272 K/UL (ref 150–450)
PMV BLD AUTO: 11.2 FL (ref 9.4–12.3)
POTASSIUM SERPL-SCNC: 4.3 MMOL/L (ref 3.5–5.1)
PROT SERPL-MCNC: 6.9 G/DL (ref 6.3–8.2)
RBC # BLD AUTO: 4.29 M/UL (ref 4.05–5.2)
SODIUM SERPL-SCNC: 137 MMOL/L (ref 136–145)
TRIGL SERPL-MCNC: 202 MG/DL (ref 35–150)
VLDLC SERPL CALC-MCNC: 40.4 MG/DL (ref 6–23)
WBC # BLD AUTO: 9.2 K/UL (ref 4.3–11.1)

## 2022-08-12 ENCOUNTER — OFFICE VISIT (OUTPATIENT)
Dept: FAMILY MEDICINE CLINIC | Facility: CLINIC | Age: 87
End: 2022-08-12
Payer: COMMERCIAL

## 2022-08-12 VITALS
HEIGHT: 63 IN | RESPIRATION RATE: 16 BRPM | OXYGEN SATURATION: 96 % | HEART RATE: 91 BPM | BODY MASS INDEX: 26.45 KG/M2 | SYSTOLIC BLOOD PRESSURE: 154 MMHG | WEIGHT: 149.3 LBS | DIASTOLIC BLOOD PRESSURE: 79 MMHG | TEMPERATURE: 97.3 F

## 2022-08-12 DIAGNOSIS — E11.649 TYPE 2 DIABETES MELLITUS WITH HYPOGLYCEMIA WITHOUT COMA, WITHOUT LONG-TERM CURRENT USE OF INSULIN (HCC): ICD-10-CM

## 2022-08-12 DIAGNOSIS — G62.9 POLYNEUROPATHY, UNSPECIFIED: ICD-10-CM

## 2022-08-12 DIAGNOSIS — Z00.00 MEDICARE ANNUAL WELLNESS VISIT, SUBSEQUENT: Primary | ICD-10-CM

## 2022-08-12 DIAGNOSIS — I10 ESSENTIAL HYPERTENSION, BENIGN: ICD-10-CM

## 2022-08-12 DIAGNOSIS — L57.0 ACTINIC KERATOSES: ICD-10-CM

## 2022-08-12 DIAGNOSIS — E78.2 MIXED HYPERLIPIDEMIA: ICD-10-CM

## 2022-08-12 DIAGNOSIS — I69.354 HEMIPARESIS AFFECTING LEFT SIDE AS LATE EFFECT OF STROKE (HCC): ICD-10-CM

## 2022-08-12 PROCEDURE — 1123F ACP DISCUSS/DSCN MKR DOCD: CPT | Performed by: FAMILY MEDICINE

## 2022-08-12 PROCEDURE — 3051F HG A1C>EQUAL 7.0%<8.0%: CPT | Performed by: FAMILY MEDICINE

## 2022-08-12 PROCEDURE — G0439 PPPS, SUBSEQ VISIT: HCPCS | Performed by: FAMILY MEDICINE

## 2022-08-12 RX ORDER — PIOGLITAZONEHYDROCHLORIDE 15 MG/1
15 TABLET ORAL DAILY
Qty: 90 TABLET | Refills: 3 | Status: SHIPPED | OUTPATIENT
Start: 2022-08-12

## 2022-08-12 RX ORDER — GABAPENTIN 300 MG/1
600 CAPSULE ORAL 3 TIMES DAILY
Qty: 180 CAPSULE | Refills: 5 | Status: SHIPPED | OUTPATIENT
Start: 2022-08-12 | End: 2023-08-07

## 2022-08-12 RX ORDER — RAMIPRIL 10 MG/1
10 CAPSULE ORAL DAILY
Qty: 90 CAPSULE | Refills: 3 | Status: SHIPPED | OUTPATIENT
Start: 2022-08-12

## 2022-08-12 RX ORDER — PRAVASTATIN SODIUM 40 MG
40 TABLET ORAL DAILY
Qty: 90 TABLET | Refills: 3 | Status: SHIPPED | OUTPATIENT
Start: 2022-08-12

## 2022-08-12 NOTE — PROGRESS NOTES
Medicare Annual Wellness Visit    Marisela Devine is here for Medicare AWV    Assessment & Plan   Medicare annual wellness visit, subsequent  Essential hypertension, benign  -     ramipril (ALTACE) 10 MG capsule; Take 1 capsule by mouth in the morning., Disp-90 capsule, R-3Normal  Mixed hyperlipidemia  -     pravastatin (PRAVACHOL) 40 MG tablet; Take 1 tablet by mouth in the morning., Disp-90 tablet, R-3Normal  Polyneuropathy, unspecified  -     gabapentin (NEURONTIN) 300 MG capsule; Take 2 capsules by mouth in the morning and 2 capsules at noon and 2 capsules before bedtime. Do all this for 360 days. Take 2 capsules three times a day., Disp-180 capsule, R-5Normal  -     diclofenac sodium (VOLTAREN) 1 % GEL; Apply 4 g topically in the morning and 4 g at noon and 4 g in the evening and 4 g before bedtime. , Topical, 4 TIMES DAILY Starting Fri 8/12/2022, Disp-200 g, R-5, Normal  Hemiparesis affecting left side as late effect of stroke ([facility] [address]  Type 2 diabetes mellitus with hypoglycemia without coma, without long-term current use of insulin (HCC)  -     pioglitazone (ACTOS) 15 MG tablet; Take 1 tablet by mouth in the morning. Take one a day., Disp-90 tablet, R-3Normal    Recommendations for Preventive Services Due: see orders and patient instructions/AVS.  Recommended screening schedule for the next 5-10 years is provided to the patient in written form: see Patient Instructions/AVS.     Return in 6 months (on 2/12/2023) for EOV, Labs one week before. Subjective     82-year-old female with underlying non-insulin-dependent diabetes, hypertension,Anxiety, history of stroke.     he has been doing well recently/  No concerns other than a persistent rash on right arm     Diabetes:  Checking blood sugars at home 140-160. Taking actos and metformin. Last A1c was last week: 7.5     HTN:  Well controlled with ramipril. She is compliant with pravastatin, asa 81 mg,     Has had the COVID vaccine.     Needs booster; will get this week. Patient's complete Health Risk Assessment and screening values have been reviewed and are found in Flowsheets. The following problems were reviewed today and where indicated follow up appointments were made and/or referrals ordered. Positive Risk Factor Screenings with Interventions:    Fall Risk:  Do you feel unsteady or are you worried about falling? : (!) yes  2 or more falls in past year?: no  Fall with injury in past year?: no   Fall Risk Interventions:    Home safety tips provided            General Health and ACP:  General  In general, how would you say your health is?: Fair  In the past 7 days, have you experienced any of the following: New or Increased Pain, New or Increased Fatigue, Loneliness, Social Isolation, Stress or Anger?: No  Do you get the social and emotional support that you need?: Yes  Do you have a Living Will?: Yes    Advance Directives       Power of  Living Will ACP-Advance Directive ACP-Power of     Not on File Not on File Not on File Not on File          General Health Risk Interventions:  Pain issues: voltaren and tylenol    Health Habits/Nutrition:  Physical Activity: Inactive    Days of Exercise per Week: 0 days    Minutes of Exercise per Session: 0 min     Have you lost any weight without trying in the past 3 months?: No  Body mass index: (!) 26.44  Have you seen the dentist within the past year?: N/A - wear dentures  Health Habits/Nutrition Interventions:  Inadequate physical activity:  educational materials provided to promote increased physical activity             Objective   Vitals:    08/12/22 1114   BP: (!) 154/79   Pulse: 91   Resp: 16   Temp: 97.3 °F (36.3 °C)   TempSrc: Temporal   SpO2: 96%   Weight: 149 lb 4.8 oz (67.7 kg)   Height: 5' 3\" (1.6 m)      Body mass index is 26.45 kg/m².       General Appearance: alert and oriented to person, place and time, well developed and well- nourished, in no acute distress  Skin: warm and dry, no rash or erythema  Head: normocephalic and atraumatic  Eyes: pupils equal, round, and reactive to light, extraocular eye movements intact, conjunctivae normal  ENT: tympanic membrane, external ear and ear canal normal bilaterally, nose without deformity, nasal mucosa and turbinates normal without polyps  Neck: supple and non-tender without mass, no thyromegaly or thyroid nodules, no cervical lymphadenopathy  Pulmonary/Chest: clear to auscultation bilaterally- no wheezes, rales or rhonchi, normal air movement, no respiratory distress  Extremities: no cyanosis, clubbing or edema  Musculoskeletal: normal range of motion, no joint swelling, deformity or tenderness  Neurologic: reflexes normal and symmetric, no cranial nerve deficit, gait, coordination and speech normal       Allergies   Allergen Reactions    Hydrocortisone Other (See Comments)     Made rash worse when pt had the Shingles    Codeine Other (See Comments)     Headache    Sulfa Antibiotics Other (See Comments)     SKIN EXCORIATION. ? CHUN FLORINA'S SYNDROME? Prior to Visit Medications    Medication Sig Taking? Authorizing Provider   ramipril (ALTACE) 10 MG capsule Take 1 capsule by mouth in the morning. Yes Sunshine Bell MD   pioglitazone (ACTOS) 15 MG tablet Take 1 tablet by mouth in the morning. Take one a day. Yes Sunshine Bell MD   gabapentin (NEURONTIN) 300 MG capsule Take 2 capsules by mouth in the morning and 2 capsules at noon and 2 capsules before bedtime. Do all this for 360 days. Take 2 capsules three times a day. Yes Sunshine Bell MD   pravastatin (PRAVACHOL) 40 MG tablet Take 1 tablet by mouth in the morning. Yes Sunshine Bell MD   diclofenac sodium (VOLTAREN) 1 % GEL Apply 4 g topically in the morning and 4 g at noon and 4 g in the evening and 4 g before bedtime.  Yes Sunshine Bell MD   acetaminophen (TYLENOL) 650 MG extended release tablet Take 650 mg by mouth 3 times daily as needed Yes Ar Automatic Reconciliation   aspirin 81 MG EC tablet Take 81 mg by mouth daily Yes Ar Automatic Reconciliation   clobetasol (TEMOVATE) 0.05 % cream Apply topically 2 times daily Yes Ar Automatic Reconciliation   DULoxetine (CYMBALTA) 30 MG extended release capsule Take 30 mg by mouth daily Yes Ar Automatic Reconciliation   metFORMIN (GLUCOPHAGE) 500 MG tablet Take 1,000 mg by mouth 2 times daily (with meals) Yes Ar Automatic Reconciliation     Lab Results   Component Value Date     08/05/2022    K 4.3 08/05/2022     08/05/2022    CO2 28 08/05/2022    BUN 12 08/05/2022    CREATININE 0.80 08/05/2022    GLUCOSE 173 (H) 08/05/2022    CALCIUM 9.3 08/05/2022    PROT 6.9 08/05/2022    LABALBU 4.2 08/05/2022    BILITOT 0.4 08/05/2022    ALKPHOS 56 08/05/2022    AST 19 08/05/2022    ALT 22 08/05/2022    LABGLOM >60 08/05/2022    GFRAA >60 08/05/2022    AGRATIO 2.0 08/03/2021    GLOB 2.7 08/05/2022     Lab Results   Component Value Date    LABA1C 7.5 (H) 08/05/2022     Lab Results   Component Value Date     08/05/2022     Lab Results   Component Value Date    CHOL 200 (H) 08/05/2022    CHOL 176 01/26/2021    CHOL 198 10/03/2019     Lab Results   Component Value Date    TRIG 202 (H) 08/05/2022    TRIG 176 (H) 01/26/2021    TRIG 206 (H) 10/03/2019     Lab Results   Component Value Date    HDL 70 (H) 08/05/2022    HDL 78 01/26/2021    HDL 68 10/03/2019     Lab Results   Component Value Date    LDLCALC 89.6 08/05/2022    LDLCALC 69 01/26/2021    LDLCALC 89 10/03/2019     Lab Results   Component Value Date    LABVLDL 40.4 (H) 08/05/2022    LABVLDL 41 (H) 10/03/2019    VLDL 29 01/26/2021     Lab Results   Component Value Date    CHOLHDLRATIO 2.9 08/05/2022         CareTeam (Including outside providers/suppliers regularly involved in providing care):   Patient Care Team:  Eli Avalos MD as PCP - General  Eli Avalos MD as PCP - [facility] Empaneled Provider     Reviewed and updated this visit:  Allergies  Med Hx  Surg Hx  Soc Hx  Fam Hx

## 2022-08-12 NOTE — PATIENT INSTRUCTIONS
Personalized Preventive Plan for Marisela Devine - 8/12/2022  Medicare offers a range of preventive health benefits. Some of the tests and screenings are paid in full while other may be subject to a deductible, co-insurance, and/or copay. Some of these benefits include a comprehensive review of your medical history including lifestyle, illnesses that may run in your family, and various assessments and screenings as appropriate. After reviewing your medical record and screening and assessments performed today your provider may have ordered immunizations, labs, imaging, and/or referrals for you. A list of these orders (if applicable) as well as your Preventive Care list are included within your After Visit Summary for your review. Other Preventive Recommendations:    A preventive eye exam performed by an eye specialist is recommended every 1-2 years to screen for glaucoma; cataracts, macular degeneration, and other eye disorders. A preventive dental visit is recommended every 6 months. Try to get at least 150 minutes of exercise per week or 10,000 steps per day on a pedometer . Order or download the FREE \"Exercise & Physical Activity: Your Everyday Guide\" from [employer]. Call 3-579.841.1990 or search [employer] online. You need 9662-0155 mg of calcium and 6535-8322 IU of vitamin D per day. It is possible to meet your calcium requirement with diet alone, but a vitamin D supplement is usually necessary to meet this goal.  When exposed to the sun, use a sunscreen that protects against both UVA and UVB radiation with an SPF of 30 or greater. Reapply every 2 to 3 hours or after sweating, drying off with a towel, or swimming. Always wear a seat belt when traveling in a car. Always wear a helmet when riding a bicycle or motorcycle.

## 2023-02-03 ENCOUNTER — TELEPHONE (OUTPATIENT)
Dept: FAMILY MEDICINE CLINIC | Facility: CLINIC | Age: 88
End: 2023-02-03

## 2023-02-03 DIAGNOSIS — E78.2 MIXED HYPERLIPIDEMIA: ICD-10-CM

## 2023-02-03 DIAGNOSIS — E11.649 TYPE 2 DIABETES MELLITUS WITH HYPOGLYCEMIA WITHOUT COMA, WITHOUT LONG-TERM CURRENT USE OF INSULIN (HCC): ICD-10-CM

## 2023-02-03 DIAGNOSIS — I10 ESSENTIAL HYPERTENSION, BENIGN: Primary | ICD-10-CM

## 2023-02-06 ENCOUNTER — NURSE ONLY (OUTPATIENT)
Dept: FAMILY MEDICINE CLINIC | Facility: CLINIC | Age: 88
End: 2023-02-06

## 2023-02-06 DIAGNOSIS — E11.649 TYPE 2 DIABETES MELLITUS WITH HYPOGLYCEMIA WITHOUT COMA, WITHOUT LONG-TERM CURRENT USE OF INSULIN (HCC): ICD-10-CM

## 2023-02-06 DIAGNOSIS — E78.2 MIXED HYPERLIPIDEMIA: ICD-10-CM

## 2023-02-06 DIAGNOSIS — I10 ESSENTIAL HYPERTENSION, BENIGN: ICD-10-CM

## 2023-02-06 LAB
BASOPHILS # BLD: 0.1 K/UL (ref 0–0.2)
BASOPHILS NFR BLD: 1 % (ref 0–2)
DIFFERENTIAL METHOD BLD: ABNORMAL
EOSINOPHIL # BLD: 0.3 K/UL (ref 0–0.8)
EOSINOPHIL NFR BLD: 4 % (ref 0.5–7.8)
ERYTHROCYTE [DISTWIDTH] IN BLOOD BY AUTOMATED COUNT: 13.4 % (ref 11.9–14.6)
HCT VFR BLD AUTO: 40.4 % (ref 35.8–46.3)
HGB BLD-MCNC: 13.9 G/DL (ref 11.7–15.4)
IMM GRANULOCYTES # BLD AUTO: 0.1 K/UL (ref 0–0.5)
IMM GRANULOCYTES NFR BLD AUTO: 2 % (ref 0–5)
LYMPHOCYTES # BLD: 1.8 K/UL (ref 0.5–4.6)
LYMPHOCYTES NFR BLD: 22 % (ref 13–44)
MCH RBC QN AUTO: 34.1 PG (ref 26.1–32.9)
MCHC RBC AUTO-ENTMCNC: 34.4 G/DL (ref 31.4–35)
MCV RBC AUTO: 99 FL (ref 82–102)
MONOCYTES # BLD: 0.9 K/UL (ref 0.1–1.3)
MONOCYTES NFR BLD: 11 % (ref 4–12)
NEUTS SEG # BLD: 4.8 K/UL (ref 1.7–8.2)
NEUTS SEG NFR BLD: 60 % (ref 43–78)
NRBC # BLD: 0 K/UL (ref 0–0.2)
PLATELET # BLD AUTO: 263 K/UL (ref 150–450)
PMV BLD AUTO: 11.3 FL (ref 9.4–12.3)
RBC # BLD AUTO: 4.08 M/UL (ref 4.05–5.2)
WBC # BLD AUTO: 7.9 K/UL (ref 4.3–11.1)

## 2023-02-07 LAB
ALBUMIN SERPL-MCNC: 4 G/DL (ref 3.2–4.6)
ALBUMIN/GLOB SERPL: 1.4 (ref 0.4–1.6)
ALP SERPL-CCNC: 45 U/L (ref 50–136)
ALT SERPL-CCNC: 22 U/L (ref 12–65)
ANION GAP SERPL CALC-SCNC: 6 MMOL/L (ref 2–11)
AST SERPL-CCNC: 16 U/L (ref 15–37)
BILIRUB SERPL-MCNC: 0.3 MG/DL (ref 0.2–1.1)
BUN SERPL-MCNC: 15 MG/DL (ref 8–23)
CALCIUM SERPL-MCNC: 8.9 MG/DL (ref 8.3–10.4)
CHLORIDE SERPL-SCNC: 107 MMOL/L (ref 101–110)
CHOLEST SERPL-MCNC: 170 MG/DL
CO2 SERPL-SCNC: 27 MMOL/L (ref 21–32)
CREAT SERPL-MCNC: 0.9 MG/DL (ref 0.6–1)
EST. AVERAGE GLUCOSE BLD GHB EST-MCNC: 166 MG/DL
GLOBULIN SER CALC-MCNC: 2.9 G/DL (ref 2.8–4.5)
GLUCOSE SERPL-MCNC: 193 MG/DL (ref 65–100)
HBA1C MFR BLD: 7.4 % (ref 4.8–5.6)
HDLC SERPL-MCNC: 74 MG/DL (ref 40–60)
HDLC SERPL: 2.3
LDLC SERPL CALC-MCNC: 63.8 MG/DL
POTASSIUM SERPL-SCNC: 5 MMOL/L (ref 3.5–5.1)
PROT SERPL-MCNC: 6.9 G/DL (ref 6.3–8.2)
SODIUM SERPL-SCNC: 140 MMOL/L (ref 133–143)
TRIGL SERPL-MCNC: 161 MG/DL (ref 35–150)
VLDLC SERPL CALC-MCNC: 32.2 MG/DL (ref 6–23)

## 2023-02-12 NOTE — PROGRESS NOTES
Pt did not view Ty Homejoy. Please try to call pt again. Rylan Cheek (:  1935) is a 82 y.o. female,Established patient, here for evaluation of the following chief complaint(s):  6 Month Follow-Up (Discuss labs/)         ASSESSMENT/PLAN:  1. Type 2 diabetes mellitus with hypoglycemia without coma, without long-term current use of insulin (HCC)  -     pioglitazone (ACTOS) 15 MG tablet; Take 1 tablet by mouth daily Take one a day, Disp-90 tablet, R-3Normal  -     metFORMIN (GLUCOPHAGE) 500 MG tablet; Take 2 tablets by mouth 2 times daily (with meals), Disp-180 tablet, R-3Normal  -     blood glucose test strips (ACCU-CHEK ARISTIDES PLUS) strip; 1 each by Does not apply route daily, Disp-100 strip, R-5Normal  -     Accu-Chek FastClix Lancets MISC; DAILY Starting 2023, Disp-100 each, R-5, Normal  -     HM DIABETES FOOT EXAM  2. Polyneuropathy, unspecified  -     diclofenac sodium (VOLTAREN) 1 % GEL; Apply 4 g topically 4 times daily, Topical, 4 TIMES DAILY Starting 2023, Disp-200 g, R-5, Normal  -     gabapentin (NEURONTIN) 300 MG capsule; Take 2 capsules by mouth 3 times daily for 360 days. Take 2 capsules three times a day, Disp-540 capsule, R-3Normal  -     DULoxetine (CYMBALTA) 30 MG extended release capsule; Take 1 capsule by mouth daily, Disp-90 capsule, R-3Normal  3. Mixed hyperlipidemia  -     pravastatin (PRAVACHOL) 40 MG tablet; Take 1 tablet by mouth daily, Disp-90 tablet, R-3Normal  4. Essential hypertension, benign  -     ramipril (ALTACE) 10 MG capsule; Take 1 capsule by mouth daily, Disp-90 capsule, R-3Normal      Plan:  No change in medication    Consider shingles vaccine. Get home test for COVID and take if have symptoms. Call office if positive. Return in about 6 months (around 2023) for AWV, wait for new Dr. or give Drs list.         Subjective   SUBJECTIVE/OBJECTIVE:  HPI  47-year-old female with underlying non-insulin-dependent diabetes, hypertension,Anxiety, history of stroke.      She  has been doing well recently/       Diabetes:  Checking blood sugars at home 140-160. Taking actos and metformin. Last A1c was last week: 7.4     HTN:  Well controlled with ramipril. She is compliant with pravastatin, asa 81 mg,     Has had the COVID vaccine. Needs booster; will get this week. considering the shingles vaccine. Review of Systems   Constitutional: Negative. Respiratory: Negative. Cardiovascular: Negative. Gastrointestinal: Negative. Negative for abdominal distention. Skin: Negative. Neurological:  Negative for headaches. Objective   Physical Exam  Vitals and nursing note reviewed. Constitutional:       General: She is not in acute distress. Appearance: She is not ill-appearing or toxic-appearing. HENT:      Head: Normocephalic and atraumatic. Right Ear: Tympanic membrane and ear canal normal.      Left Ear: Tympanic membrane and ear canal normal.   Cardiovascular:      Rate and Rhythm: Normal rate and regular rhythm. Pulmonary:      Effort: Pulmonary effort is normal.      Breath sounds: Normal breath sounds. Neurological:      General: No focal deficit present. Mental Status: She is alert and oriented to person, place, and time. Psychiatric:         Mood and Affect: Mood normal.         Behavior: Behavior normal.         Thought Content: Thought content normal.         Judgment: Judgment normal.        Vitals:    02/13/23 1120   BP: 136/78   Pulse: 85   Resp: 17   Temp: 98.2 °F (36.8 °C)   SpO2: 98%       On this date 2/13/2023 I have spent 30 minutes reviewing previous notes, test results and face to face with the patient discussing the diagnosis and importance of compliance with the treatment plan as well as documenting on the day of the visit. An electronic signature was used to authenticate this note.     --Amber Martínez MD, MD

## 2023-02-13 ENCOUNTER — OFFICE VISIT (OUTPATIENT)
Dept: FAMILY MEDICINE CLINIC | Facility: CLINIC | Age: 88
End: 2023-02-13
Payer: COMMERCIAL

## 2023-02-13 VITALS
DIASTOLIC BLOOD PRESSURE: 78 MMHG | BODY MASS INDEX: 26.49 KG/M2 | OXYGEN SATURATION: 98 % | HEIGHT: 63 IN | HEART RATE: 85 BPM | TEMPERATURE: 98.2 F | SYSTOLIC BLOOD PRESSURE: 136 MMHG | WEIGHT: 149.5 LBS | RESPIRATION RATE: 17 BRPM

## 2023-02-13 DIAGNOSIS — E11.649 TYPE 2 DIABETES MELLITUS WITH HYPOGLYCEMIA WITHOUT COMA, WITHOUT LONG-TERM CURRENT USE OF INSULIN (HCC): Primary | ICD-10-CM

## 2023-02-13 DIAGNOSIS — G62.9 POLYNEUROPATHY, UNSPECIFIED: ICD-10-CM

## 2023-02-13 DIAGNOSIS — E78.2 MIXED HYPERLIPIDEMIA: ICD-10-CM

## 2023-02-13 DIAGNOSIS — I10 ESSENTIAL HYPERTENSION, BENIGN: ICD-10-CM

## 2023-02-13 PROCEDURE — G8400 PT W/DXA NO RESULTS DOC: HCPCS | Performed by: FAMILY MEDICINE

## 2023-02-13 PROCEDURE — 1090F PRES/ABSN URINE INCON ASSESS: CPT | Performed by: FAMILY MEDICINE

## 2023-02-13 PROCEDURE — 1123F ACP DISCUSS/DSCN MKR DOCD: CPT | Performed by: FAMILY MEDICINE

## 2023-02-13 PROCEDURE — G8417 CALC BMI ABV UP PARAM F/U: HCPCS | Performed by: FAMILY MEDICINE

## 2023-02-13 PROCEDURE — 3078F DIAST BP <80 MM HG: CPT | Performed by: FAMILY MEDICINE

## 2023-02-13 PROCEDURE — 99214 OFFICE O/P EST MOD 30 MIN: CPT | Performed by: FAMILY MEDICINE

## 2023-02-13 PROCEDURE — G8484 FLU IMMUNIZE NO ADMIN: HCPCS | Performed by: FAMILY MEDICINE

## 2023-02-13 PROCEDURE — G8427 DOCREV CUR MEDS BY ELIG CLIN: HCPCS | Performed by: FAMILY MEDICINE

## 2023-02-13 PROCEDURE — 3075F SYST BP GE 130 - 139MM HG: CPT | Performed by: FAMILY MEDICINE

## 2023-02-13 PROCEDURE — 1036F TOBACCO NON-USER: CPT | Performed by: FAMILY MEDICINE

## 2023-02-13 PROCEDURE — 3051F HG A1C>EQUAL 7.0%<8.0%: CPT | Performed by: FAMILY MEDICINE

## 2023-02-13 RX ORDER — LANCETS
100 EACH MISCELLANEOUS DAILY
COMMUNITY
Start: 2021-12-20 | End: 2023-02-13 | Stop reason: SDUPTHER

## 2023-02-13 RX ORDER — BLOOD SUGAR DIAGNOSTIC
1 STRIP MISCELLANEOUS DAILY
Qty: 100 STRIP | Refills: 5 | Status: SHIPPED | OUTPATIENT
Start: 2023-02-13

## 2023-02-13 RX ORDER — LANCETS
100 EACH MISCELLANEOUS DAILY
Qty: 100 EACH | Refills: 5 | Status: SHIPPED | OUTPATIENT
Start: 2023-02-13

## 2023-02-13 RX ORDER — PIOGLITAZONEHYDROCHLORIDE 15 MG/1
15 TABLET ORAL DAILY
Qty: 90 TABLET | Refills: 3 | Status: SHIPPED | OUTPATIENT
Start: 2023-02-13

## 2023-02-13 RX ORDER — BLOOD SUGAR DIAGNOSTIC
100 STRIP MISCELLANEOUS DAILY
COMMUNITY
Start: 2022-02-04 | End: 2023-02-13 | Stop reason: SDUPTHER

## 2023-02-13 RX ORDER — DULOXETIN HYDROCHLORIDE 30 MG/1
30 CAPSULE, DELAYED RELEASE ORAL DAILY
Qty: 90 CAPSULE | Refills: 3 | Status: SHIPPED | OUTPATIENT
Start: 2023-02-13

## 2023-02-13 RX ORDER — RAMIPRIL 10 MG/1
10 CAPSULE ORAL DAILY
Qty: 90 CAPSULE | Refills: 3 | Status: SHIPPED | OUTPATIENT
Start: 2023-02-13

## 2023-02-13 RX ORDER — PRAVASTATIN SODIUM 40 MG
40 TABLET ORAL DAILY
Qty: 90 TABLET | Refills: 3 | Status: SHIPPED | OUTPATIENT
Start: 2023-02-13

## 2023-02-13 RX ORDER — GABAPENTIN 300 MG/1
600 CAPSULE ORAL 3 TIMES DAILY
Qty: 540 CAPSULE | Refills: 3 | Status: SHIPPED | OUTPATIENT
Start: 2023-02-13 | End: 2024-02-08

## 2023-02-13 SDOH — ECONOMIC STABILITY: INCOME INSECURITY: HOW HARD IS IT FOR YOU TO PAY FOR THE VERY BASICS LIKE FOOD, HOUSING, MEDICAL CARE, AND HEATING?: NOT HARD AT ALL

## 2023-02-13 SDOH — ECONOMIC STABILITY: HOUSING INSECURITY
IN THE LAST 12 MONTHS, WAS THERE A TIME WHEN YOU DID NOT HAVE A STEADY PLACE TO SLEEP OR SLEPT IN A SHELTER (INCLUDING NOW)?: NO

## 2023-02-13 SDOH — ECONOMIC STABILITY: FOOD INSECURITY: WITHIN THE PAST 12 MONTHS, THE FOOD YOU BOUGHT JUST DIDN'T LAST AND YOU DIDN'T HAVE MONEY TO GET MORE.: NEVER TRUE

## 2023-02-13 SDOH — ECONOMIC STABILITY: FOOD INSECURITY: WITHIN THE PAST 12 MONTHS, YOU WORRIED THAT YOUR FOOD WOULD RUN OUT BEFORE YOU GOT MONEY TO BUY MORE.: NEVER TRUE

## 2023-02-13 ASSESSMENT — PATIENT HEALTH QUESTIONNAIRE - PHQ9
SUM OF ALL RESPONSES TO PHQ QUESTIONS 1-9: 0
2. FEELING DOWN, DEPRESSED OR HOPELESS: 0
SUM OF ALL RESPONSES TO PHQ9 QUESTIONS 1 & 2: 0
SUM OF ALL RESPONSES TO PHQ QUESTIONS 1-9: 0
SUM OF ALL RESPONSES TO PHQ QUESTIONS 1-9: 0
1. LITTLE INTEREST OR PLEASURE IN DOING THINGS: 0
SUM OF ALL RESPONSES TO PHQ QUESTIONS 1-9: 0

## 2023-02-13 ASSESSMENT — ENCOUNTER SYMPTOMS
ABDOMINAL DISTENTION: 0
RESPIRATORY NEGATIVE: 1
GASTROINTESTINAL NEGATIVE: 1

## 2023-02-16 RX ORDER — LANCETS
1 EACH MISCELLANEOUS 4 TIMES DAILY
Qty: 400 EACH | Refills: 5 | Status: SHIPPED | OUTPATIENT
Start: 2023-02-16

## 2023-06-26 ENCOUNTER — TELEPHONE (OUTPATIENT)
Dept: FAMILY MEDICINE CLINIC | Facility: CLINIC | Age: 88
End: 2023-06-26

## 2023-06-26 ENCOUNTER — PATIENT MESSAGE (OUTPATIENT)
Dept: FAMILY MEDICINE CLINIC | Facility: CLINIC | Age: 88
End: 2023-06-26

## 2023-09-25 ENCOUNTER — TELEPHONE (OUTPATIENT)
Dept: FAMILY MEDICINE CLINIC | Facility: CLINIC | Age: 88
End: 2023-09-25

## 2023-09-25 DIAGNOSIS — E11.649 TYPE 2 DIABETES MELLITUS WITH HYPOGLYCEMIA WITHOUT COMA, WITHOUT LONG-TERM CURRENT USE OF INSULIN (HCC): ICD-10-CM

## 2023-09-25 RX ORDER — PIOGLITAZONEHYDROCHLORIDE 15 MG/1
15 TABLET ORAL DAILY
Qty: 90 TABLET | Refills: 0 | Status: SHIPPED | OUTPATIENT
Start: 2023-09-25

## 2023-09-25 NOTE — TELEPHONE ENCOUNTER
Patient seen Dr Paxton Wilson but she is scheduled for December but she will run out of the Actos 15 mg 1 daily and Metformin 500 mg 2 tablets twice a day   90 day supply   [facility] @ [facility] [place]

## 2023-12-19 DIAGNOSIS — E78.2 MIXED HYPERLIPIDEMIA: ICD-10-CM

## 2023-12-19 DIAGNOSIS — E11.649 TYPE 2 DIABETES MELLITUS WITH HYPOGLYCEMIA WITHOUT COMA, WITHOUT LONG-TERM CURRENT USE OF INSULIN (HCC): ICD-10-CM

## 2023-12-19 DIAGNOSIS — I10 ESSENTIAL HYPERTENSION, BENIGN: ICD-10-CM

## 2023-12-19 LAB
ALBUMIN SERPL-MCNC: 4.3 G/DL (ref 3.2–4.6)
ALBUMIN/GLOB SERPL: 1.5 (ref 0.4–1.6)
ALP SERPL-CCNC: 48 U/L (ref 50–136)
ALT SERPL-CCNC: 27 U/L (ref 12–65)
ANION GAP SERPL CALC-SCNC: 6 MMOL/L (ref 2–11)
AST SERPL-CCNC: 26 U/L (ref 15–37)
BASOPHILS # BLD: 0.1 K/UL (ref 0–0.2)
BASOPHILS NFR BLD: 1 % (ref 0–2)
BILIRUB SERPL-MCNC: 0.4 MG/DL (ref 0.2–1.1)
BUN SERPL-MCNC: 10 MG/DL (ref 8–23)
CALCIUM SERPL-MCNC: 9.6 MG/DL (ref 8.3–10.4)
CHLORIDE SERPL-SCNC: 107 MMOL/L (ref 103–113)
CHOLEST SERPL-MCNC: 166 MG/DL
CO2 SERPL-SCNC: 28 MMOL/L (ref 21–32)
CREAT SERPL-MCNC: 0.8 MG/DL (ref 0.6–1)
DIFFERENTIAL METHOD BLD: NORMAL
EOSINOPHIL # BLD: 0.2 K/UL (ref 0–0.8)
EOSINOPHIL NFR BLD: 2 % (ref 0.5–7.8)
ERYTHROCYTE [DISTWIDTH] IN BLOOD BY AUTOMATED COUNT: 13.7 % (ref 11.9–14.6)
GLOBULIN SER CALC-MCNC: 2.9 G/DL (ref 2.8–4.5)
GLUCOSE SERPL-MCNC: 156 MG/DL (ref 65–100)
HCT VFR BLD AUTO: 43.1 % (ref 35.8–46.3)
HDLC SERPL-MCNC: 79 MG/DL (ref 40–60)
HDLC SERPL: 2.1
HGB BLD-MCNC: 14.4 G/DL (ref 11.7–15.4)
IMM GRANULOCYTES # BLD AUTO: 0 K/UL (ref 0–0.5)
IMM GRANULOCYTES NFR BLD AUTO: 0 % (ref 0–5)
LDLC SERPL CALC-MCNC: 66.2 MG/DL
LYMPHOCYTES # BLD: 1.6 K/UL (ref 0.5–4.6)
LYMPHOCYTES NFR BLD: 20 % (ref 13–44)
MCH RBC QN AUTO: 32.7 PG (ref 26.1–32.9)
MCHC RBC AUTO-ENTMCNC: 33.4 G/DL (ref 31.4–35)
MCV RBC AUTO: 98 FL (ref 82–102)
MONOCYTES # BLD: 0.8 K/UL (ref 0.1–1.3)
MONOCYTES NFR BLD: 10 % (ref 4–12)
NEUTS SEG # BLD: 5.2 K/UL (ref 1.7–8.2)
NEUTS SEG NFR BLD: 67 % (ref 43–78)
NRBC # BLD: 0 K/UL (ref 0–0.2)
PLATELET # BLD AUTO: 276 K/UL (ref 150–450)
PMV BLD AUTO: 11.7 FL (ref 9.4–12.3)
POTASSIUM SERPL-SCNC: 5 MMOL/L (ref 3.5–5.1)
PROT SERPL-MCNC: 7.2 G/DL (ref 6.3–8.2)
RBC # BLD AUTO: 4.4 M/UL (ref 4.05–5.2)
SODIUM SERPL-SCNC: 141 MMOL/L (ref 136–146)
TRIGL SERPL-MCNC: 104 MG/DL (ref 35–150)
VLDLC SERPL CALC-MCNC: 20.8 MG/DL (ref 6–23)
WBC # BLD AUTO: 7.8 K/UL (ref 4.3–11.1)

## 2023-12-20 LAB
EST. AVERAGE GLUCOSE BLD GHB EST-MCNC: 148 MG/DL
HBA1C MFR BLD: 6.8 % (ref 4.8–5.6)

## 2024-06-19 SDOH — ECONOMIC STABILITY: FOOD INSECURITY: WITHIN THE PAST 12 MONTHS, THE FOOD YOU BOUGHT JUST DIDN'T LAST AND YOU DIDN'T HAVE MONEY TO GET MORE.: NEVER TRUE

## 2024-06-19 SDOH — ECONOMIC STABILITY: INCOME INSECURITY: HOW HARD IS IT FOR YOU TO PAY FOR THE VERY BASICS LIKE FOOD, HOUSING, MEDICAL CARE, AND HEATING?: NOT HARD AT ALL

## 2024-06-19 SDOH — ECONOMIC STABILITY: TRANSPORTATION INSECURITY
IN THE PAST 12 MONTHS, HAS LACK OF TRANSPORTATION KEPT YOU FROM MEETINGS, WORK, OR FROM GETTING THINGS NEEDED FOR DAILY LIVING?: NO

## 2024-06-19 SDOH — ECONOMIC STABILITY: FOOD INSECURITY: WITHIN THE PAST 12 MONTHS, YOU WORRIED THAT YOUR FOOD WOULD RUN OUT BEFORE YOU GOT MONEY TO BUY MORE.: NEVER TRUE

## 2024-06-19 SDOH — HEALTH STABILITY: PHYSICAL HEALTH: ON AVERAGE, HOW MANY MINUTES DO YOU ENGAGE IN EXERCISE AT THIS LEVEL?: 0 MIN

## 2024-06-19 SDOH — HEALTH STABILITY: PHYSICAL HEALTH: ON AVERAGE, HOW MANY DAYS PER WEEK DO YOU ENGAGE IN MODERATE TO STRENUOUS EXERCISE (LIKE A BRISK WALK)?: 0 DAYS

## 2024-06-19 ASSESSMENT — PATIENT HEALTH QUESTIONNAIRE - PHQ9
SUM OF ALL RESPONSES TO PHQ QUESTIONS 1-9: 0
1. LITTLE INTEREST OR PLEASURE IN DOING THINGS: NOT AT ALL
SUM OF ALL RESPONSES TO PHQ9 QUESTIONS 1 & 2: 0
2. FEELING DOWN, DEPRESSED OR HOPELESS: NOT AT ALL
SUM OF ALL RESPONSES TO PHQ QUESTIONS 1-9: 0

## 2024-06-19 ASSESSMENT — LIFESTYLE VARIABLES
HOW MANY STANDARD DRINKS CONTAINING ALCOHOL DO YOU HAVE ON A TYPICAL DAY: 0
HOW MANY STANDARD DRINKS CONTAINING ALCOHOL DO YOU HAVE ON A TYPICAL DAY: PATIENT DOES NOT DRINK
HOW OFTEN DO YOU HAVE A DRINK CONTAINING ALCOHOL: NEVER
HOW OFTEN DO YOU HAVE SIX OR MORE DRINKS ON ONE OCCASION: 1
HOW OFTEN DO YOU HAVE A DRINK CONTAINING ALCOHOL: 1

## 2024-06-20 ENCOUNTER — OFFICE VISIT (OUTPATIENT)
Dept: PRIMARY CARE CLINIC | Facility: CLINIC | Age: 89
End: 2024-06-20

## 2024-06-20 VITALS
HEIGHT: 63 IN | TEMPERATURE: 97.3 F | HEART RATE: 80 BPM | DIASTOLIC BLOOD PRESSURE: 78 MMHG | BODY MASS INDEX: 26.15 KG/M2 | WEIGHT: 147.6 LBS | SYSTOLIC BLOOD PRESSURE: 131 MMHG | OXYGEN SATURATION: 96 %

## 2024-06-20 DIAGNOSIS — E78.2 MIXED HYPERLIPIDEMIA: ICD-10-CM

## 2024-06-20 DIAGNOSIS — G62.9 POLYNEUROPATHY, UNSPECIFIED: ICD-10-CM

## 2024-06-20 DIAGNOSIS — E11.40 TYPE 2 DIABETES MELLITUS WITH DIABETIC NEUROPATHY, WITHOUT LONG-TERM CURRENT USE OF INSULIN (HCC): ICD-10-CM

## 2024-06-20 DIAGNOSIS — Z79.899 ENCOUNTER FOR LONG-TERM (CURRENT) USE OF MEDICATIONS: ICD-10-CM

## 2024-06-20 DIAGNOSIS — I10 ESSENTIAL HYPERTENSION, BENIGN: ICD-10-CM

## 2024-06-20 DIAGNOSIS — I69.354 HEMIPARESIS AFFECTING LEFT SIDE AS LATE EFFECT OF STROKE (HCC): ICD-10-CM

## 2024-06-20 DIAGNOSIS — Z00.00 MEDICARE ANNUAL WELLNESS VISIT, SUBSEQUENT: Primary | ICD-10-CM

## 2024-06-20 RX ORDER — PIOGLITAZONEHYDROCHLORIDE 15 MG/1
15 TABLET ORAL DAILY
Qty: 90 TABLET | Refills: 3 | Status: SHIPPED | OUTPATIENT
Start: 2024-06-20

## 2024-06-20 NOTE — PROGRESS NOTES
[facility]  Carmen Redding M.D.  [address] [place] [address]  Phone:  [phone]  Fax:  [phone]    CHIEF COMPLAINT:  Chief Complaint   Patient presents with    Medicare AWV     Here for medicare wellness exam     Medicare Annual Wellness Visit    Radha Carney is here for Medicare AWV (Here for medicare wellness exam )    Assessment & Plan   Medicare annual wellness visit, subsequent  Type 2 diabetes mellitus with diabetic neuropathy, without long-term current use of insulin (HCC)  Hemiparesis affecting left side as late effect of stroke (HCC)  Polyneuropathy, unspecified  -     diclofenac sodium (VOLTAREN) 1 % GEL; Apply 4 g topically 4 times daily, Topical, 4 TIMES DAILY Starting Thu 6/20/2024, Disp-200 g, R-5, Normal  Essential hypertension, benign  Mixed hyperlipidemia  Encounter for long-term (current) use of medications    Recommendations for Preventive Services Due: see orders and patient instructions/AVS.  Recommended screening schedule for the next 5-10 years is provided to the patient in written form: see Patient Instructions/AVS.     Return in about 6 months (around 12/20/2024) for LABS BEFORE NEXT APPOINTMENT.     Subjective       Patient's complete Health Risk Assessment and screening values have been reviewed and are found in Flowsheets. The following problems were reviewed today and where indicated follow up appointments were made and/or referrals ordered.    Positive Risk Factor Screenings with Interventions:    Fall Risk:  Do you feel unsteady or are you worried about falling? : (!) yes  2 or more falls in past year?: (!) yes  Fall with injury in past year?: no     Interventions:    Reviewed medications, home hazards, visual acuity, and co-morbidities that can increase risk for falls             Activity, Diet, and Weight:  On average, how many days per week do you engage in moderate to strenuous exercise (like a brisk walk)?: 0 days  On average, how

## 2024-12-10 ENCOUNTER — LAB (OUTPATIENT)
Dept: PRIMARY CARE CLINIC | Facility: CLINIC | Age: 89
End: 2024-12-10

## 2024-12-10 DIAGNOSIS — E11.40 TYPE 2 DIABETES MELLITUS WITH DIABETIC NEUROPATHY, WITHOUT LONG-TERM CURRENT USE OF INSULIN (HCC): ICD-10-CM

## 2024-12-10 DIAGNOSIS — E78.2 MIXED HYPERLIPIDEMIA: Primary | ICD-10-CM

## 2024-12-10 DIAGNOSIS — Z51.81 ENCOUNTER FOR MEDICATION MONITORING: ICD-10-CM

## 2024-12-10 DIAGNOSIS — Z79.899 ENCOUNTER FOR LONG-TERM (CURRENT) USE OF MEDICATIONS: ICD-10-CM

## 2024-12-10 LAB
ALBUMIN SERPL-MCNC: 4 G/DL (ref 3.2–4.6)
ALBUMIN/GLOB SERPL: 1.4 (ref 1–1.9)
ALP SERPL-CCNC: 49 U/L (ref 35–104)
ALT SERPL-CCNC: 12 U/L (ref 8–45)
ANION GAP SERPL CALC-SCNC: 10 MMOL/L (ref 7–16)
AST SERPL-CCNC: 22 U/L (ref 15–37)
BASOPHILS # BLD: 0.1 K/UL (ref 0–0.2)
BASOPHILS NFR BLD: 1 % (ref 0–2)
BILIRUB SERPL-MCNC: 0.3 MG/DL (ref 0–1.2)
BUN SERPL-MCNC: 12 MG/DL (ref 8–23)
CALCIUM SERPL-MCNC: 9.5 MG/DL (ref 8.8–10.2)
CHLORIDE SERPL-SCNC: 103 MMOL/L (ref 98–107)
CHOLEST SERPL-MCNC: 170 MG/DL (ref 0–200)
CO2 SERPL-SCNC: 27 MMOL/L (ref 20–29)
CREAT SERPL-MCNC: 0.81 MG/DL (ref 0.6–1.1)
DIFFERENTIAL METHOD BLD: NORMAL
EOSINOPHIL # BLD: 0.1 K/UL (ref 0–0.8)
EOSINOPHIL NFR BLD: 2 % (ref 0.5–7.8)
ERYTHROCYTE [DISTWIDTH] IN BLOOD BY AUTOMATED COUNT: 13.6 % (ref 11.9–14.6)
EST. AVERAGE GLUCOSE BLD GHB EST-MCNC: 178 MG/DL
GLOBULIN SER CALC-MCNC: 2.8 G/DL (ref 2.3–3.5)
GLUCOSE SERPL-MCNC: 164 MG/DL (ref 70–99)
HBA1C MFR BLD: 7.8 % (ref 0–5.6)
HCT VFR BLD AUTO: 41.7 % (ref 35.8–46.3)
HDLC SERPL-MCNC: 72 MG/DL (ref 40–60)
HDLC SERPL: 2.4 (ref 0–5)
HGB BLD-MCNC: 13.9 G/DL (ref 11.7–15.4)
IMM GRANULOCYTES # BLD AUTO: 0 K/UL (ref 0–0.5)
IMM GRANULOCYTES NFR BLD AUTO: 0 % (ref 0–5)
LDLC SERPL CALC-MCNC: 64 MG/DL (ref 0–100)
LYMPHOCYTES # BLD: 1.6 K/UL (ref 0.5–4.6)
LYMPHOCYTES NFR BLD: 20 % (ref 13–44)
MCH RBC QN AUTO: 32.8 PG (ref 26.1–32.9)
MCHC RBC AUTO-ENTMCNC: 33.3 G/DL (ref 31.4–35)
MCV RBC AUTO: 98.3 FL (ref 82–102)
MONOCYTES # BLD: 0.7 K/UL (ref 0.1–1.3)
MONOCYTES NFR BLD: 9 % (ref 4–12)
NEUTS SEG # BLD: 5.6 K/UL (ref 1.7–8.2)
NEUTS SEG NFR BLD: 68 % (ref 43–78)
NRBC # BLD: 0 K/UL (ref 0–0.2)
PLATELET # BLD AUTO: 294 K/UL (ref 150–450)
PMV BLD AUTO: 11.3 FL (ref 9.4–12.3)
POTASSIUM SERPL-SCNC: 4.7 MMOL/L (ref 3.5–5.1)
PROT SERPL-MCNC: 6.8 G/DL (ref 6.3–8.2)
RBC # BLD AUTO: 4.24 M/UL (ref 4.05–5.2)
SODIUM SERPL-SCNC: 140 MMOL/L (ref 136–145)
TRIGL SERPL-MCNC: 168 MG/DL (ref 0–150)
VLDLC SERPL CALC-MCNC: 34 MG/DL (ref 6–23)
WBC # BLD AUTO: 8.2 K/UL (ref 4.3–11.1)

## 2025-01-02 ENCOUNTER — OFFICE VISIT (OUTPATIENT)
Dept: PRIMARY CARE CLINIC | Facility: CLINIC | Age: 89
End: 2025-01-02

## 2025-01-02 VITALS
TEMPERATURE: 98.6 F | OXYGEN SATURATION: 96 % | DIASTOLIC BLOOD PRESSURE: 68 MMHG | HEART RATE: 79 BPM | WEIGHT: 147 LBS | BODY MASS INDEX: 26.04 KG/M2 | SYSTOLIC BLOOD PRESSURE: 143 MMHG

## 2025-01-02 DIAGNOSIS — Z79.899 ENCOUNTER FOR LONG-TERM (CURRENT) USE OF MEDICATIONS: ICD-10-CM

## 2025-01-02 DIAGNOSIS — I10 ESSENTIAL HYPERTENSION, BENIGN: ICD-10-CM

## 2025-01-02 DIAGNOSIS — M79.601 RIGHT ARM PAIN: ICD-10-CM

## 2025-01-02 DIAGNOSIS — M79.642 PAIN IN BOTH HANDS: ICD-10-CM

## 2025-01-02 DIAGNOSIS — I69.354 HEMIPARESIS AFFECTING LEFT SIDE AS LATE EFFECT OF STROKE (HCC): ICD-10-CM

## 2025-01-02 DIAGNOSIS — G62.9 POLYNEUROPATHY, UNSPECIFIED: ICD-10-CM

## 2025-01-02 DIAGNOSIS — E78.2 MIXED HYPERLIPIDEMIA: ICD-10-CM

## 2025-01-02 DIAGNOSIS — M79.641 PAIN IN BOTH HANDS: ICD-10-CM

## 2025-01-02 DIAGNOSIS — E11.40 TYPE 2 DIABETES MELLITUS WITH DIABETIC NEUROPATHY, WITHOUT LONG-TERM CURRENT USE OF INSULIN (HCC): Primary | ICD-10-CM

## 2025-01-02 RX ORDER — RAMIPRIL 10 MG/1
10 CAPSULE ORAL DAILY
Qty: 90 CAPSULE | Refills: 3 | Status: SHIPPED | OUTPATIENT
Start: 2025-01-02

## 2025-01-02 RX ORDER — DICLOFENAC SODIUM 75 MG/1
75 TABLET, DELAYED RELEASE ORAL 2 TIMES DAILY
Qty: 180 TABLET | Refills: 3 | Status: SHIPPED | OUTPATIENT
Start: 2025-01-02

## 2025-01-02 RX ORDER — FEXOFENADINE HCL 180 MG/1
180 TABLET ORAL DAILY
Qty: 90 TABLET | Refills: 3 | Status: SHIPPED | OUTPATIENT
Start: 2025-01-02

## 2025-01-02 RX ORDER — PRAVASTATIN SODIUM 40 MG
40 TABLET ORAL DAILY
Qty: 90 TABLET | Refills: 3 | Status: SHIPPED | OUTPATIENT
Start: 2025-01-02

## 2025-01-02 RX ORDER — GABAPENTIN 300 MG/1
600 CAPSULE ORAL 3 TIMES DAILY
Qty: 540 CAPSULE | Refills: 3 | Status: SHIPPED | OUTPATIENT
Start: 2025-01-02 | End: 2025-12-28

## 2025-01-02 RX ORDER — DULOXETIN HYDROCHLORIDE 30 MG/1
30 CAPSULE, DELAYED RELEASE ORAL DAILY
Qty: 90 CAPSULE | Refills: 3 | Status: SHIPPED | OUTPATIENT
Start: 2025-01-02

## 2025-01-02 RX ORDER — MISOPROSTOL 200 UG/1
200 TABLET ORAL 2 TIMES DAILY
Qty: 180 TABLET | Refills: 3 | Status: SHIPPED | OUTPATIENT
Start: 2025-01-02

## 2025-01-02 ASSESSMENT — PATIENT HEALTH QUESTIONNAIRE - PHQ9
SUM OF ALL RESPONSES TO PHQ QUESTIONS 1-9: 0
SUM OF ALL RESPONSES TO PHQ9 QUESTIONS 1 & 2: 0
1. LITTLE INTEREST OR PLEASURE IN DOING THINGS: NOT AT ALL
2. FEELING DOWN, DEPRESSED OR HOPELESS: NOT AT ALL
SUM OF ALL RESPONSES TO PHQ QUESTIONS 1-9: 0

## 2025-03-14 ENCOUNTER — TELEPHONE (OUTPATIENT)
Dept: PRIMARY CARE CLINIC | Facility: CLINIC | Age: 89
End: 2025-03-14

## 2025-03-14 NOTE — TELEPHONE ENCOUNTER
Em [phone] pt's daughter, the pt just had eye surgery about 2 days ago.  Experiencing pain and bruising in her right arm. She has not contacted the eye surgeon clinic or taken her to urgent care

## 2025-03-31 ENCOUNTER — TELEMEDICINE (OUTPATIENT)
Dept: PRIMARY CARE CLINIC | Facility: CLINIC | Age: 89
End: 2025-03-31

## 2025-03-31 DIAGNOSIS — M79.601 RIGHT ARM PAIN: Primary | ICD-10-CM

## 2025-03-31 DIAGNOSIS — M25.511 CHRONIC RIGHT SHOULDER PAIN: ICD-10-CM

## 2025-03-31 DIAGNOSIS — G89.29 CHRONIC RIGHT SHOULDER PAIN: ICD-10-CM

## 2025-03-31 RX ORDER — MELOXICAM 7.5 MG/1
7.5 TABLET ORAL DAILY
Qty: 30 TABLET | Refills: 5 | Status: SHIPPED | OUTPATIENT
Start: 2025-03-31

## 2025-03-31 SDOH — ECONOMIC STABILITY: INCOME INSECURITY: IN THE LAST 12 MONTHS, WAS THERE A TIME WHEN YOU WERE NOT ABLE TO PAY THE MORTGAGE OR RENT ON TIME?: NO

## 2025-03-31 SDOH — ECONOMIC STABILITY: FOOD INSECURITY: WITHIN THE PAST 12 MONTHS, YOU WORRIED THAT YOUR FOOD WOULD RUN OUT BEFORE YOU GOT MONEY TO BUY MORE.: NEVER TRUE

## 2025-03-31 SDOH — ECONOMIC STABILITY: FOOD INSECURITY: WITHIN THE PAST 12 MONTHS, THE FOOD YOU BOUGHT JUST DIDN'T LAST AND YOU DIDN'T HAVE MONEY TO GET MORE.: NEVER TRUE

## 2025-03-31 SDOH — ECONOMIC STABILITY: TRANSPORTATION INSECURITY
IN THE PAST 12 MONTHS, HAS THE LACK OF TRANSPORTATION KEPT YOU FROM MEDICAL APPOINTMENTS OR FROM GETTING MEDICATIONS?: NO

## 2025-03-31 NOTE — PROGRESS NOTES
[facility]  Carmen Redding M.D.  [address] [place] [address]  Phone:  [phone]  Fax:  [phone]          I was in the office while conducting this encounter.  The patient was at home.    CHIEF COMPLAINT:  Chief Complaint   Patient presents with    Arm Pain     Patient c/o right arm pain that has been worsening over the past 5 months. In the last 3 weeks she has not been able to raise her arm over waist high. No known injury to the area. A bruise did come up on the bicep area 3 weeks ago but has gone away now. Patient has been taking tylenol arthritis. She could not tolerate the Voltaren, it gave her diarrhea, so she stopped it. She did use the Voltaren Gel, but was concerned it was causing bruising, so she d/c that. Pt.  reports that was only somewhat helpful          HISTORY OF PRESENT ILLNESS:  Ms. Carney is a 89 y.o. female  who presents with complaints of right arm and shoulder pain.  Patient states that the pain has been worsening over the past 4 or 5 months.  The pain is mostly in the right biceps area.  It travels from the shoulder down to the biceps.  She denies any history of trauma.  She was trying the diclofenac gel but it caused a bruise in the area so she stopped the medication.  She also tried the diclofenac tablets with misoprostol but it caused diarrhea so she stopped the medication.  She has been trying to take over-the-counter Tylenol without much relief.    HISTORY:  Allergies   Allergen Reactions    Hydrocortisone Other (See Comments)     Made rash worse when pt had the Shingles    Codeine Other (See Comments)     Headache    Sulfa Antibiotics Other (See Comments)     SKIN EXCORIATION. ? CHUN FLORINA'S SYNDROME?         REVIEW OF SYSTEMS:  Review of systems is as indicated in HPI otherwise negative.    PHYSICAL EXAM:    Vital Signs: (As obtained by patient/caregiver at home)      3/31/2025     3:51 PM   Patient-Reported Vitals   Patient-Reported

## 2025-06-26 ENCOUNTER — LAB (OUTPATIENT)
Dept: PRIMARY CARE CLINIC | Facility: CLINIC | Age: 89
End: 2025-06-26

## 2025-06-26 DIAGNOSIS — Z79.899 ENCOUNTER FOR LONG-TERM (CURRENT) USE OF MEDICATIONS: ICD-10-CM

## 2025-06-26 DIAGNOSIS — E78.2 MIXED HYPERLIPIDEMIA: ICD-10-CM

## 2025-06-26 DIAGNOSIS — E11.40 TYPE 2 DIABETES MELLITUS WITH DIABETIC NEUROPATHY, UNSPECIFIED WHETHER LONG TERM INSULIN USE (HCC): ICD-10-CM

## 2025-06-26 DIAGNOSIS — E11.40 TYPE 2 DIABETES MELLITUS WITH DIABETIC NEUROPATHY, UNSPECIFIED WHETHER LONG TERM INSULIN USE (HCC): Primary | ICD-10-CM

## 2025-06-26 LAB
ALBUMIN SERPL-MCNC: 4 G/DL (ref 3.2–4.6)
ALBUMIN/GLOB SERPL: 1.5 (ref 1–1.9)
ALP SERPL-CCNC: 53 U/L (ref 35–104)
ALT SERPL-CCNC: 15 U/L (ref 8–45)
ANION GAP SERPL CALC-SCNC: 12 MMOL/L (ref 7–16)
AST SERPL-CCNC: 18 U/L (ref 15–37)
BASOPHILS # BLD: 0.04 K/UL (ref 0–0.2)
BASOPHILS NFR BLD: 0.4 % (ref 0–2)
BILIRUB SERPL-MCNC: 0.4 MG/DL (ref 0–1.2)
BUN SERPL-MCNC: 12 MG/DL (ref 8–23)
CALCIUM SERPL-MCNC: 9.7 MG/DL (ref 8.8–10.2)
CHLORIDE SERPL-SCNC: 102 MMOL/L (ref 98–107)
CHOLEST SERPL-MCNC: 175 MG/DL (ref 0–200)
CO2 SERPL-SCNC: 26 MMOL/L (ref 20–29)
CREAT SERPL-MCNC: 0.77 MG/DL (ref 0.6–1.1)
DIFFERENTIAL METHOD BLD: NORMAL
EOSINOPHIL # BLD: 0.29 K/UL (ref 0–0.8)
EOSINOPHIL NFR BLD: 3 % (ref 0.5–7.8)
ERYTHROCYTE [DISTWIDTH] IN BLOOD BY AUTOMATED COUNT: 13.1 % (ref 11.9–14.6)
EST. AVERAGE GLUCOSE BLD GHB EST-MCNC: 185 MG/DL
GLOBULIN SER CALC-MCNC: 2.7 G/DL (ref 2.3–3.5)
GLUCOSE SERPL-MCNC: 157 MG/DL (ref 70–99)
HBA1C MFR BLD: 8.1 % (ref 0–5.6)
HCT VFR BLD AUTO: 42.1 % (ref 35.8–46.3)
HDLC SERPL-MCNC: 73 MG/DL (ref 40–60)
HDLC SERPL: 2.4 (ref 0–5)
HGB BLD-MCNC: 14.1 G/DL (ref 11.7–15.4)
IMM GRANULOCYTES # BLD AUTO: 0.02 K/UL (ref 0–0.5)
IMM GRANULOCYTES NFR BLD AUTO: 0.2 % (ref 0–5)
LDLC SERPL CALC-MCNC: 68 MG/DL (ref 0–100)
LYMPHOCYTES # BLD: 2.2 K/UL (ref 0.5–4.6)
LYMPHOCYTES NFR BLD: 22.8 % (ref 13–44)
MCH RBC QN AUTO: 32.6 PG (ref 26.1–32.9)
MCHC RBC AUTO-ENTMCNC: 33.5 G/DL (ref 31.4–35)
MCV RBC AUTO: 97.5 FL (ref 82–102)
MONOCYTES # BLD: 0.81 K/UL (ref 0.1–1.3)
MONOCYTES NFR BLD: 8.4 % (ref 4–12)
NEUTS SEG # BLD: 6.3 K/UL (ref 1.7–8.2)
NEUTS SEG NFR BLD: 65.2 % (ref 43–78)
NRBC # BLD: 0 K/UL (ref 0–0.2)
PLATELET # BLD AUTO: 257 K/UL (ref 150–450)
PMV BLD AUTO: 11.4 FL (ref 9.4–12.3)
POTASSIUM SERPL-SCNC: 4.9 MMOL/L (ref 3.5–5.1)
PROT SERPL-MCNC: 6.7 G/DL (ref 6.3–8.2)
RBC # BLD AUTO: 4.32 M/UL (ref 4.05–5.2)
SODIUM SERPL-SCNC: 140 MMOL/L (ref 136–145)
TRIGL SERPL-MCNC: 175 MG/DL (ref 0–150)
VLDLC SERPL CALC-MCNC: 35 MG/DL (ref 6–23)
WBC # BLD AUTO: 9.7 K/UL (ref 4.3–11.1)

## 2025-07-03 ENCOUNTER — OFFICE VISIT (OUTPATIENT)
Dept: PRIMARY CARE CLINIC | Facility: CLINIC | Age: 89
End: 2025-07-03

## 2025-07-03 VITALS
BODY MASS INDEX: 26.86 KG/M2 | OXYGEN SATURATION: 98 % | SYSTOLIC BLOOD PRESSURE: 131 MMHG | DIASTOLIC BLOOD PRESSURE: 78 MMHG | WEIGHT: 151.6 LBS | HEIGHT: 63 IN | TEMPERATURE: 98.1 F | HEART RATE: 87 BPM

## 2025-07-03 DIAGNOSIS — G62.9 POLYNEUROPATHY, UNSPECIFIED: ICD-10-CM

## 2025-07-03 DIAGNOSIS — Z79.899 ENCOUNTER FOR LONG-TERM (CURRENT) USE OF MEDICATIONS: ICD-10-CM

## 2025-07-03 DIAGNOSIS — E78.2 MIXED HYPERLIPIDEMIA: ICD-10-CM

## 2025-07-03 DIAGNOSIS — I10 ESSENTIAL HYPERTENSION, BENIGN: ICD-10-CM

## 2025-07-03 DIAGNOSIS — Z86.73 HISTORY OF STROKE: ICD-10-CM

## 2025-07-03 DIAGNOSIS — Z00.00 MEDICARE ANNUAL WELLNESS VISIT, SUBSEQUENT: Primary | ICD-10-CM

## 2025-07-03 DIAGNOSIS — E11.649 TYPE 2 DIABETES MELLITUS WITH HYPOGLYCEMIA WITHOUT COMA, WITHOUT LONG-TERM CURRENT USE OF INSULIN (HCC): ICD-10-CM

## 2025-07-03 DIAGNOSIS — R26.81 UNSTEADY GAIT WHEN WALKING: ICD-10-CM

## 2025-07-03 RX ORDER — PRAVASTATIN SODIUM 40 MG
40 TABLET ORAL DAILY
Qty: 90 TABLET | Refills: 3 | Status: SHIPPED | OUTPATIENT
Start: 2025-07-03

## 2025-07-03 RX ORDER — BLOOD SUGAR DIAGNOSTIC
1 STRIP MISCELLANEOUS 2 TIMES DAILY
Qty: 300 STRIP | Refills: 5 | Status: SHIPPED | OUTPATIENT
Start: 2025-07-03

## 2025-07-03 RX ORDER — GABAPENTIN 300 MG/1
600 CAPSULE ORAL 3 TIMES DAILY
Qty: 540 CAPSULE | Refills: 3 | Status: SHIPPED | OUTPATIENT
Start: 2025-07-03 | End: 2026-06-28

## 2025-07-03 RX ORDER — PIOGLITAZONE 15 MG/1
15 TABLET ORAL DAILY
Qty: 90 TABLET | Refills: 3 | Status: SHIPPED | OUTPATIENT
Start: 2025-07-03

## 2025-07-03 ASSESSMENT — PATIENT HEALTH QUESTIONNAIRE - PHQ9
1. LITTLE INTEREST OR PLEASURE IN DOING THINGS: NOT AT ALL
SUM OF ALL RESPONSES TO PHQ QUESTIONS 1-9: 0
2. FEELING DOWN, DEPRESSED OR HOPELESS: NOT AT ALL
SUM OF ALL RESPONSES TO PHQ QUESTIONS 1-9: 0

## 2025-07-03 ASSESSMENT — LIFESTYLE VARIABLES
HOW MANY STANDARD DRINKS CONTAINING ALCOHOL DO YOU HAVE ON A TYPICAL DAY: PATIENT DOES NOT DRINK
HOW OFTEN DO YOU HAVE A DRINK CONTAINING ALCOHOL: NEVER

## 2025-07-03 NOTE — PROGRESS NOTES
Medicare Annual Wellness Visit    Radha Carney is here for Medicare AWV (Here for medicare wellness exam ) and Results (Patient recently had lab work done, would like to discuss results /)    Assessment & Plan   Medicare annual wellness visit, subsequent  Type 2 diabetes mellitus with hypoglycemia without coma, without long-term current use of insulin (HCC)  -     blood glucose test strips (ACCU-CHEK ARISTIDES PLUS) strip; 1 each by Does not apply route 2 times daily, Disp-300 strip, R-5Normal  Essential hypertension, benign  Polyneuropathy, unspecified  -     gabapentin (NEURONTIN) 300 MG capsule; Take 2 capsules by mouth 3 times daily for 360 days., Disp-540 capsule, R-3Normal  Mixed hyperlipidemia  -     pravastatin (PRAVACHOL) 40 MG tablet; Take 1 tablet by mouth daily, Disp-90 tablet, R-3Normal  Unsteady gait when walking  History of stroke  Encounter for long-term (current) use of medications       Return in about 6 months (around 1/3/2026) for LABS BEFORE NEXT APPOINTMENT.     Subjective       Patient's complete Health Risk Assessment and screening values have been reviewed and are found in Flowsheets. The following problems were reviewed today and where indicated follow up appointments were made and/or referrals ordered.    Positive Risk Factor Screenings with Interventions:              Inactivity:  On average, how many days per week do you engage in moderate to strenuous exercise (like a brisk walk)?: 0 days (!) Abnormal  On average, how many minutes do you engage in exercise at this level?: 0 min  Interventions:  Patient declined any further interventions or treatment    Poor Eating Habits/Diet:  Do you eat balanced/healthy meals regularly?: (!) No  Interventions:  Patient declines any further evaluation or treatment         ADL's:   Patient reports needing help with:  Select all that apply: (!) Walking/Balance (uses a cane)  Interventions:  Patient declined any further interventions or treatment          
Patient declined all other medications (OTC, provided by clinic and prescribed) as well as additional testing/imaging/diagnostics at this time. Patient aware of risks associated with declining treatment/recommendations and/or non-compliance with plan of care.    *Side effects, adverse effects, risks versus benefits associated with medications prescribed/recommended were discussed with the patient. Patient verbalized understanding. All questions answered.    *Patient was encouraged to return to the clinic and/or PCP. Or seek emergent care if worsening signs and symptoms warrant immediate evaluation including, but not limited to HA, blurred vision, facial asymmetry, speech disturbance, difficulty with ambulation/gait, numbness, tingling, weakness, syncope, chest pain (with or without radiation), left arm pain, jaw pain, changes in hearing (loss), fever, unexplained sweating, malaise/fatigue, difficulty swallowing, mental changes (confusion, AMS), lightheadedness/dizziness, difficulty breathing, or shortness of breath.    I have reviewed the patient's medication list, past medical, family, social, and surgical history in detail and updated the patient record appropriately.    I have reviewed the patient's vital signs and discussed risks associated with any abnormal vital signs (during visit and following this visit) as well as appropriate parameters with the patient. Patient verbalized understanding. Patient agreed to seek emergent care if vital signs are above or below the parameters discussed.     Explanatory note: Be assured that the information provided to create your medical record comes from your provider. The written transcription portion of this note is prepared electronically by voice-recognition software. At times, there may be some errors in capitalization, punctuation, tense, or context that are inherent in the system, but your provider reviews the note for content and to ensure that the note contains

## 2025-07-03 NOTE — PATIENT INSTRUCTIONS
Learning About Being Active as an Older Adult  Why is being active important as you get older?     Being active is one of the best things you can do for your health. And it's never too late to start. Being active--or getting active, if you aren't already--has definite benefits. It can:  Give you more energy,  Keep your mind sharp.  Improve balance to reduce your risk of falls.  Help you manage chronic illness with fewer medicines.  No matter how old you are, how fit you are, or what health problems you have, there is a form of activity that will work for you. And the more physical activity you can do, the better your overall health will be.  What kinds of activity can help you stay healthy?  Being more active will make your daily activities easier. Physical activity includes planned exercise and things you do in daily life. There are four types of activity:  Aerobic.  Doing aerobic activity makes your heart and lungs strong.  Includes walking, dancing, and gardening.  Aim for at least 2½ hours spread throughout the week.  It improves your energy and can help you sleep better.  Muscle-strengthening.  This type of activity can help maintain muscle and strengthen bones.  Includes climbing stairs, using resistance bands, and lifting or carrying heavy loads.  Aim for at least twice a week.  It can help protect the knees and other joints.  Stretching.  Stretching gives you better range of motion in joints and muscles.  Includes upper arm stretches, calf stretches, and gentle yoga.  Aim for at least twice a week, preferably after your muscles are warmed up from other activities.  It can help you function better in daily life.  Balancing.  This helps you stay coordinated and have good posture.  Includes heel-to-toe walking, matheus chi, and certain types of yoga.  Aim for at least 3 days a week.  It can reduce your risk of falling.  Even if you have a hard time meeting the recommendations, it's better to be more active